# Patient Record
Sex: MALE | Race: WHITE | NOT HISPANIC OR LATINO | Employment: OTHER | URBAN - METROPOLITAN AREA
[De-identification: names, ages, dates, MRNs, and addresses within clinical notes are randomized per-mention and may not be internally consistent; named-entity substitution may affect disease eponyms.]

---

## 2019-04-09 ENCOUNTER — OFFICE VISIT (OUTPATIENT)
Dept: OPTOMETRY | Facility: CLINIC | Age: 54
End: 2019-04-09
Payer: COMMERCIAL

## 2019-04-09 DIAGNOSIS — H53.022 REFRACTIVE AMBLYOPIA OF LEFT EYE: ICD-10-CM

## 2019-04-09 DIAGNOSIS — H52.03 HYPEROPIA WITH PRESBYOPIA OF BOTH EYES: Primary | ICD-10-CM

## 2019-04-09 DIAGNOSIS — H31.002 CHORIORETINAL SCAR OF LEFT EYE: ICD-10-CM

## 2019-04-09 DIAGNOSIS — H52.222 REGULAR ASTIGMATISM OF LEFT EYE: ICD-10-CM

## 2019-04-09 DIAGNOSIS — H52.4 HYPEROPIA WITH PRESBYOPIA OF BOTH EYES: Primary | ICD-10-CM

## 2019-04-09 ASSESSMENT — EXTERNAL EXAM - LEFT EYE: OS_EXAM: NORMAL

## 2019-04-09 ASSESSMENT — VISUAL ACUITY
METHOD: SNELLEN - LINEAR
OD_CC: 1.25M
OD_CC: 20/30
OS_CC: 20/70-1
CORRECTION_TYPE: CONTACTS

## 2019-04-09 ASSESSMENT — REFRACTION_MANIFEST
OS_AXIS: 045
OS_CYLINDER: +2.25
OD_SPHERE: +3.25
OD_CYLINDER: SPHERE
OS_SPHERE: +3.25

## 2019-04-09 ASSESSMENT — EXTERNAL EXAM - RIGHT EYE: OD_EXAM: NORMAL

## 2019-04-09 ASSESSMENT — TONOMETRY
IOP_METHOD: ICARE
OD_IOP_MMHG: 21
OS_IOP_MMHG: 19

## 2019-04-09 ASSESSMENT — REFRACTION_CURRENTRX
OD_BRAND: AIR OPTIX MULTIFOCAL
OD_ADD: HIGH
OD_DIAMETER: 14.2
OS_BRAND: AIR OPTIX MULTIFOCAL
OS_DIAMETER: 14.2
OS_BASECURVE: 8.6
OD_SPHERE: +3.50
OS_SPHERE: +3.50
OS_ADD: HIGH
OD_BASECURVE: 8.6

## 2019-04-09 ASSESSMENT — CUP TO DISC RATIO
OD_RATIO: 0.35
OS_RATIO: 0.35

## 2019-04-09 ASSESSMENT — SLIT LAMP EXAM - LIDS
COMMENTS: NORMAL
COMMENTS: NORMAL

## 2019-04-09 ASSESSMENT — CONF VISUAL FIELD
OS_NORMAL: 1
OD_NORMAL: 1

## 2019-04-12 ENCOUNTER — TELEPHONE (OUTPATIENT)
Dept: OPTOMETRY | Facility: CLINIC | Age: 54
End: 2019-04-12

## 2019-04-12 NOTE — TELEPHONE ENCOUNTER
[facility]    Phone Message    May a detailed message be left on voicemail: yes    Reason for Call: Other: per edgard from [facility], would like pts glasses rx faxed over to [phone] with reference number [id number] on the cover sheet, thanks     Action Taken: Message routed to:  [facility] ([facility] eye

## 2019-10-03 ENCOUNTER — HEALTH MAINTENANCE LETTER (OUTPATIENT)
Age: 54
End: 2019-10-03

## 2019-12-27 ENCOUNTER — MYC MEDICAL ADVICE (OUTPATIENT)
Dept: FAMILY MEDICINE | Facility: CLINIC | Age: 54
End: 2019-12-27

## 2019-12-27 NOTE — TELEPHONE ENCOUNTER
Routing to Provider.     Would you like patient to make OV? Or refer to PT? Order queued. Ashtyn Peterson RN on 12/27/2019 at 2:14 PM

## 2020-11-07 ENCOUNTER — HEALTH MAINTENANCE LETTER (OUTPATIENT)
Age: 55
End: 2020-11-07

## 2021-08-12 ENCOUNTER — OFFICE VISIT (OUTPATIENT)
Dept: FAMILY MEDICINE | Facility: CLINIC | Age: 56
End: 2021-08-12
Payer: COMMERCIAL

## 2021-08-12 VITALS
SYSTOLIC BLOOD PRESSURE: 130 MMHG | WEIGHT: 195 LBS | BODY MASS INDEX: 25.84 KG/M2 | OXYGEN SATURATION: 98 % | TEMPERATURE: 97 F | DIASTOLIC BLOOD PRESSURE: 68 MMHG | HEART RATE: 78 BPM | HEIGHT: 73 IN

## 2021-08-12 DIAGNOSIS — Z12.5 SCREENING PSA (PROSTATE SPECIFIC ANTIGEN): ICD-10-CM

## 2021-08-12 DIAGNOSIS — Z00.00 ROUTINE HISTORY AND PHYSICAL EXAMINATION OF ADULT: Primary | ICD-10-CM

## 2021-08-12 DIAGNOSIS — R00.2 PALPITATIONS: ICD-10-CM

## 2021-08-12 DIAGNOSIS — G89.29 CHRONIC PAIN OF LEFT KNEE: ICD-10-CM

## 2021-08-12 DIAGNOSIS — M25.562 CHRONIC PAIN OF LEFT KNEE: ICD-10-CM

## 2021-08-12 DIAGNOSIS — Z13.6 CARDIOVASCULAR SCREENING; LDL GOAL LESS THAN 160: ICD-10-CM

## 2021-08-12 PROCEDURE — 36415 COLL VENOUS BLD VENIPUNCTURE: CPT | Performed by: FAMILY MEDICINE

## 2021-08-12 PROCEDURE — 80053 COMPREHEN METABOLIC PANEL: CPT | Performed by: FAMILY MEDICINE

## 2021-08-12 PROCEDURE — 93000 ELECTROCARDIOGRAM COMPLETE: CPT | Performed by: FAMILY MEDICINE

## 2021-08-12 PROCEDURE — G0103 PSA SCREENING: HCPCS | Performed by: FAMILY MEDICINE

## 2021-08-12 PROCEDURE — 80061 LIPID PANEL: CPT | Performed by: FAMILY MEDICINE

## 2021-08-12 PROCEDURE — 99213 OFFICE O/P EST LOW 20 MIN: CPT | Mod: 25 | Performed by: FAMILY MEDICINE

## 2021-08-12 PROCEDURE — 99386 PREV VISIT NEW AGE 40-64: CPT | Performed by: FAMILY MEDICINE

## 2021-08-12 ASSESSMENT — MIFFLIN-ST. JEOR: SCORE: 1767

## 2021-08-12 NOTE — PROGRESS NOTES
SUBJECTIVE:   CC: John Rivera is an 56 year old male who presents for preventative health visit.       Patient has been advised of split billing requirements and indicates understanding: Yes     Healthy Habits:     Getting at least 3 servings of Calcium per day:  NO    Bi-annual eye exam:  Yes    Dental care twice a year:  Yes    Sleep apnea or symptoms of sleep apnea:  None    Diet:  Regular (no restrictions)    Frequency of exercise:  None    Taking medications regularly:  Yes    Medication side effects:  Not applicable    PHQ-2 Total Score: 0    Additional concerns today:  Yes          Encounter Diagnoses   Name Primary?     Routine history and physical examination of adult Yes     Palpitations recently when working/ may have been dehydrated      CARDIOVASCULAR SCREENING; LDL GOAL LESS THAN 160      Chronic pain of left knee      Screening PSA (prostate specific antigen)         Today's PHQ-2 Score:   PHQ-2 ( 1999 Pf[employer]) 8/12/2021   Q1: Little interest or pleasure in doing things 0   Q2: Feeling down, depressed or hopeless 0   PHQ-2 Score 0   Q1: Little interest or pleasure in doing things Not at all   Q2: Feeling down, depressed or hopeless Not at all   PHQ-2 Score 0       Abuse: Current or Past(Physical, Sexual or Emotional)- No  Do you feel safe in your environment? Yes    Have you ever done Advance Care Planning? (For example, a Health Directive, POLST, or a discussion with a medical provider or your loved ones about your wishes):     Social History     Tobacco Use     Smoking status: Never Smoker     Smokeless tobacco: Never Used   Substance Use Topics     Alcohol use: Yes     Comment: 3/week     If you drink alcohol do you typically have >3 drinks per day or >7 drinks per week? No    Alcohol Use 8/12/2021   Prescreen: >3 drinks/day or >7 drinks/week? Yes   Prescreen: >3 drinks/day or >7 drinks/week? -   AUDIT SCORE  6     AUDIT - Alcohol Use Disorders Identification Test - Reproduced from the World  Health Organization Audit 2001 (Second Edition) 8/12/2021   1.  How often do you have a drink containing alcohol? 4 or more times a week   2.  How many drinks containing alcohol do you have on a typical day when you are drinking? 1 or 2   3.  How often do you have five or more drinks on one occasion? Monthly   4.  How often during the last year have you found that you were not able to stop drinking once you had started? Never   5.  How often during the last year have you failed to do what was normally expected of you because of drinking? Never   6.  How often during the last year have you needed a first drink in the morning to get yourself going after a heavy drinking session? Never   7.  How often during the last year have you had a feeling of guilt or remorse after drinking? Never   8.  How often during the last year have you been unable to remember what happened the night before because of your drinking? Never   9.  Have you or someone else been injured because of your drinking? No   10. Has a relative, friend, doctor or other health care worker been concerned about your drinking or suggested you cut down? No   TOTAL SCORE 6       Last PSA: No results found for: PSA    Reviewed orders with patient. Reviewed health maintenance and updated orders accordingly -   Lab work is in process    Reviewed and updated as needed this visit by clinical staff  Tobacco  Allergies  Meds              Reviewed and updated as needed this visit by Provider                Past Medical History:   Diagnosis Date     NO ACTIVE PROBLEMS         Review of Systems  CONSTITUTIONAL: NEGATIVE for fever, chills, change in weight  INTEGUMENTARY/SKIN: NEGATIVE for worrisome rashes, moles or lesions  EYES: NEGATIVE for vision changes or irritation  ENT: NEGATIVE for ear, mouth and throat problems  RESP: NEGATIVE for significant cough or SOB  CV: POSITIVE for palpitations and NEGATIVE for diaphoresis, dyspnea on exertion, irregular heart beat  "and lower extremity edema  GI: NEGATIVE for nausea, abdominal pain, heartburn, or change in bowel habits   male: negative for dysuria, hematuria, decreased urinary stream, erectile dysfunction, urethral discharge  MUSCULOSKELETAL: NEGATIVE for significant arthralgias or myalgia  PSYCHIATRIC: NEGATIVE for changes in mood or affect    OBJECTIVE:   /68   Pulse 78   Temp 97  F (36.1  C) (Temporal)   Ht 1.86 m (6' 1.23\")   Wt 88.5 kg (195 lb)   SpO2 98%   BMI 25.57 kg/m      Physical Exam  GENERAL: healthy, alert and no distress  EYES: Eyes grossly normal to inspection, PERRL and conjunctivae and sclerae normal  HENT: ear canals and TM's normal, nose and mouth without ulcers or lesions  NECK: no adenopathy, no asymmetry, masses, or scars and thyroid normal to palpation  RESP: lungs clear to auscultation - no rales, rhonchi or wheezes  CV: regular rate and rhythm, normal S1 S2, no S3 or S4, no murmur, click or rub, no peripheral edema and peripheral pulses strong  ABDOMEN: soft, nontender, no hepatosplenomegaly, no masses and bowel sounds normal   (male): normal male genitalia without lesions or urethral discharge, no hernia  RECTAL (male): normal sphincter tone, no rectal masses, prostate normal size, smooth, nontender without nodules or masses  MS: no gross musculoskeletal defects noted, no edema  SKIN: no suspicious lesions or rashes  NEURO: Normal strength and tone, sensory exam grossly normal and mentation intact  PSYCH: mentation appears normal, affect normal/bright    Diagnostic Test Results:  Labs reviewed in Epic  EKG NSR  ASSESSMENT/PLAN:   1. Routine history and physical examination of adult  In good health, needs to get knee better so he can exercise    2. Palpitations  normal exam  - EKG 12-lead complete w/read - Clinics  - Comprehensive metabolic panel (BMP + Alb, Alk Phos, ALT, AST, Total. Bili, TP); Future  - Comprehensive metabolic panel (BMP + Alb, Alk Phos, ALT, AST, Total. Bili, " "TP)    3. CARDIOVASCULAR SCREENING; LDL GOAL LESS THAN 160  sent  - Lipid panel reflex to direct LDL Fasting; Future  - Lipid panel reflex to direct LDL Fasting    4. Chronic pain of left knee  Follow up with consultant as planned.     5. Screening PSA (prostate specific antigen)  sent  - PSA, screen; Future  - PSA, screen   COUNSELING:   Reviewed preventive health counseling, as reflected in patient instructions       Regular exercise       Healthy diet/nutrition       Vision screening       Safe sex practices/STD prevention       Colon cancer screening       Prostate cancer screening    Estimated body mass index is 25.57 kg/m  as calculated from the following:    Height as of this encounter: 1.86 m (6' 1.23\").    Weight as of this encounter: 88.5 kg (195 lb).         He reports that he has never smoked. He has never used smokeless tobacco.      Counseling Resources:  ATP IV Guidelines  Pooled Cohorts Equation Calculator  FRAX Risk Assessment  ICSI Preventive Guidelines  Dietary Guidelines for Americans, 2010  USDA's MyPlate  ASA Prophylaxis  Lung CA Screening    Nayan Galaviz MD  [employer]  "

## 2021-08-13 LAB
ALBUMIN SERPL-MCNC: 3.7 G/DL (ref 3.4–5)
ALP SERPL-CCNC: 136 U/L (ref 40–150)
ALT SERPL W P-5'-P-CCNC: 23 U/L (ref 0–70)
ANION GAP SERPL CALCULATED.3IONS-SCNC: 2 MMOL/L (ref 3–14)
AST SERPL W P-5'-P-CCNC: 21 U/L (ref 0–45)
BILIRUB SERPL-MCNC: 0.3 MG/DL (ref 0.2–1.3)
BUN SERPL-MCNC: 16 MG/DL (ref 7–30)
CALCIUM SERPL-MCNC: 9.2 MG/DL (ref 8.5–10.1)
CHLORIDE BLD-SCNC: 108 MMOL/L (ref 94–109)
CHOLEST SERPL-MCNC: 240 MG/DL
CO2 SERPL-SCNC: 30 MMOL/L (ref 20–32)
CREAT SERPL-MCNC: 1.02 MG/DL (ref 0.66–1.25)
FASTING STATUS PATIENT QL REPORTED: NO
GFR SERPL CREATININE-BSD FRML MDRD: 81 ML/MIN/1.73M2
GLUCOSE BLD-MCNC: 80 MG/DL (ref 70–99)
HDLC SERPL-MCNC: 84 MG/DL
LDLC SERPL CALC-MCNC: 138 MG/DL
NONHDLC SERPL-MCNC: 156 MG/DL
POTASSIUM BLD-SCNC: 4.2 MMOL/L (ref 3.4–5.3)
PROT SERPL-MCNC: 7 G/DL (ref 6.8–8.8)
PSA SERPL-MCNC: 0.45 UG/L (ref 0–4)
SODIUM SERPL-SCNC: 140 MMOL/L (ref 133–144)
TRIGL SERPL-MCNC: 89 MG/DL

## 2021-09-05 ENCOUNTER — HEALTH MAINTENANCE LETTER (OUTPATIENT)
Age: 56
End: 2021-09-05

## 2021-09-20 NOTE — PROGRESS NOTES
[facility]  [address]  [facility] [address]  Phone: [phone]  Primary Provider: Nayan Galaviz  Pre-op Performing Provider: LUDWIN ABRAHAM      PREOPERATIVE EVALUATION:  Today's date: 9/21/2021    John Rivera is a 56 year old male who presents for a preoperative evaluation.    Surgical Information:  Surgery/Procedure: left knee meniscus repair  Surgery Location: [facility]  Surgeon: Dr. Rose  Surgery Date: 9/22/21  Time of Surgery: 1:15 PM  Where patient plans to recover: At home with family  Fax number for surgical facility: [phone]    Type of Anesthesia Anticipated: General    Assessment & Plan     The proposed surgical procedure is considered INTERMEDIATE risk.    Preop general physical exam  - Comprehensive metabolic panel; Future  - Hemoglobin; Future  - INFLUENZA QUAD, PF (RIV4) (FLUBLOK)    Chronic pain of left knee  preop done today; follow up with orthopedics  - Comprehensive metabolic panel; Future  - Hemoglobin; Future  - INFLUENZA QUAD, PF (RIV4) (FLUBLOK)    Need for immunization against influenza  - INFLUENZA QUAD, PF (RIV4) (FLUBLOK)      Risks and Recommendations:  The patient has the following additional risks and recommendations for perioperative complications:   - No identified additional risk factors other than previously addressed    Do not take any aspirin or any aspirin-containing products at least 10 days prior to your surgical procedure. This includes any anti-inflammatory medications or NSAIDS (ibuprofen, advil, aleve type products). If you have any questions regarding this or any medication you are currently taking on a regular basis, please call and speak to your surgeon or surgeon's nurse.      Medication Instructions:  Patient is on no chronic medications    RECOMMENDATION:  APPROVAL GIVEN to proceed with proposed procedure, without further diagnostic evaluation.    Review of prior external note(s) from -  previous PCP notes      10 minutes spent on the date of the encounter doing chart review, history and exam, documentation and further activities per the note        Subjective     HPI related to upcoming procedure: history of left meniscal tear with plans for surgical repair    Preop Questions 9/14/2021   1. Have you ever had a heart attack or stroke? No   2. Have you ever had surgery on your heart or blood vessels, such as a stent placement, a coronary artery bypass, or surgery on an artery in your head, neck, heart, or legs? No   3. Do you have chest pain with activity? No   4. Do you have a history of  heart failure? No   5. Do you currently have a cold, bronchitis or symptoms of other infection? No   6. Do you have a cough, shortness of breath, or wheezing? No   7. Do you or anyone in your family have previous history of blood clots? No   8. Do you or does anyone in your family have a serious bleeding problem such as prolonged bleeding following surgeries or cuts? No   9. Have you ever had problems with anemia or been told to take iron pills? No   10. Have you had any abnormal blood loss such as black, tarry or bloody stools? No   11. Have you ever had a blood transfusion? No   12. Are you willing to have a blood transfusion if it is medically needed before, during, or after your surgery? Yes   13. Have you or any of your relatives ever had problems with anesthesia? No   14. Do you have sleep apnea, excessive snoring or daytime drowsiness? No   15. Do you have any artifical heart valves or other implanted medical devices like a pacemaker, defibrillator, or continuous glucose monitor? No   16. Do you have artificial joints? No   17. Are you allergic to latex? No       Health Care Directive:  Patient does not have a Health Care Directive or Living Will: Discussed advance care planning with patient; however, patient declined at this time.    Preoperative Review of :   reviewed - no record of controlled  "substances prescribed.      Status of Chronic Conditions:  See problem list for active medical problems.  Problems all longstanding and stable, except as noted/documented.  See ROS for pertinent symptoms related to these conditions.      Review of Systems  Constitutional, neuro, ENT, endocrine, pulmonary, cardiac, gastrointestinal, genitourinary, musculoskeletal, integument and psychiatric systems are negative, except as otherwise noted.    Patient Active Problem List    Diagnosis Date Noted     Degeneration of meniscus of left knee 06/19/2015     Priority: Medium     Skin exam, screening for cancer 02/19/2014     Priority: Medium     CARDIOVASCULAR SCREENING; LDL GOAL LESS THAN 160 02/05/2013     Priority: Medium      Past Medical History:   Diagnosis Date     NO ACTIVE PROBLEMS      Past Surgical History:   Procedure Laterality Date     COLONOSCOPY       LAMINECT/DISCECTOMY, LUMBAR  1992     No current outpatient medications on file.       No Known Allergies     Social History     Tobacco Use     Smoking status: Never Smoker     Smokeless tobacco: Never Used   Substance Use Topics     Alcohol use: Yes     Comment: 3/week     Family History   Problem Relation Age of Onset     Heart Disease Mother      Hypertension Mother      Other Cancer Mother      Cancer Father      Hypertension Father      Cerebrovascular Disease Maternal Grandmother      Cerebrovascular Disease Maternal Grandfather      Heart Disease Paternal Grandfather      Skin Cancer No family hx of      Glaucoma No family hx of      Macular Degeneration No family hx of      History   Drug Use No         Objective     /76 (Patient Position: Sitting, Cuff Size: Adult Large)   Pulse 67   Temp 97.6  F (36.4  C) (Tympanic)   Resp 22   Ht 1.859 m (6' 1.2\")   Wt 89.6 kg (197 lb 9.6 oz)   SpO2 98%   BMI 25.93 kg/m      Physical Exam    GENERAL APPEARANCE: healthy, alert and no distress     EYES: EOMI,  PERRL     HENT: ear canals and TM's normal and " nose and mouth without ulcers or lesions     NECK: no adenopathy, no asymmetry, masses, or scars and thyroid normal to palpation     RESP: lungs clear to auscultation - no rales, rhonchi or wheezes     CV: regular rates and rhythm, normal S1 S2, no S3 or S4 and no murmur, click or rub     ABDOMEN:  soft, nontender, no HSM or masses and bowel sounds normal     MS: extremities normal- no gross deformities noted, no evidence of inflammation in joints, FROM in all extremities.     SKIN: no suspicious lesions or rashes     NEURO: Normal strength and tone, sensory exam grossly normal, mentation intact and speech normal     PSYCH: mentation appears normal. and affect normal/bright     LYMPHATICS: No cervical adenopathy    Recent Labs   Lab Test 08/12/21  1625      POTASSIUM 4.2   CR 1.02        Diagnostics:  Labs pending at this time.  Results will be reviewed when available.   No EKG required, no history of coronary heart disease, significant arrhythmia, peripheral arterial disease or other structural heart disease.    Revised Cardiac Risk Index (RCRI):  The patient has the following serious cardiovascular risks for perioperative complications:   - No serious cardiac risks = 0 points     RCRI Interpretation: 0 points: Class I (very low risk - 0.4% complication rate)           Signed Electronically by: Amanda Young PA-C  Copy of this evaluation report is provided to requesting physician.

## 2021-09-20 NOTE — PATIENT INSTRUCTIONS

## 2021-09-21 ENCOUNTER — OFFICE VISIT (OUTPATIENT)
Dept: FAMILY MEDICINE | Facility: CLINIC | Age: 56
End: 2021-09-21
Payer: COMMERCIAL

## 2021-09-21 VITALS
DIASTOLIC BLOOD PRESSURE: 76 MMHG | OXYGEN SATURATION: 98 % | BODY MASS INDEX: 26.19 KG/M2 | SYSTOLIC BLOOD PRESSURE: 127 MMHG | HEART RATE: 67 BPM | WEIGHT: 197.6 LBS | RESPIRATION RATE: 22 BRPM | HEIGHT: 73 IN | TEMPERATURE: 97.6 F

## 2021-09-21 DIAGNOSIS — Z01.818 PREOP GENERAL PHYSICAL EXAM: Primary | ICD-10-CM

## 2021-09-21 DIAGNOSIS — Z23 NEED FOR IMMUNIZATION AGAINST INFLUENZA: ICD-10-CM

## 2021-09-21 DIAGNOSIS — M25.562 CHRONIC PAIN OF LEFT KNEE: ICD-10-CM

## 2021-09-21 DIAGNOSIS — G89.29 CHRONIC PAIN OF LEFT KNEE: ICD-10-CM

## 2021-09-21 LAB — HGB BLD-MCNC: 14.9 G/DL (ref 13.3–17.7)

## 2021-09-21 PROCEDURE — 90471 IMMUNIZATION ADMIN: CPT | Performed by: PHYSICIAN ASSISTANT

## 2021-09-21 PROCEDURE — 85018 HEMOGLOBIN: CPT | Performed by: PHYSICIAN ASSISTANT

## 2021-09-21 PROCEDURE — 99214 OFFICE O/P EST MOD 30 MIN: CPT | Mod: 25 | Performed by: PHYSICIAN ASSISTANT

## 2021-09-21 PROCEDURE — 80053 COMPREHEN METABOLIC PANEL: CPT | Performed by: PHYSICIAN ASSISTANT

## 2021-09-21 PROCEDURE — 90682 RIV4 VACC RECOMBINANT DNA IM: CPT | Performed by: PHYSICIAN ASSISTANT

## 2021-09-21 PROCEDURE — 36415 COLL VENOUS BLD VENIPUNCTURE: CPT | Performed by: PHYSICIAN ASSISTANT

## 2021-09-21 ASSESSMENT — MIFFLIN-ST. JEOR: SCORE: 1778.36

## 2021-09-21 NOTE — NURSING NOTE
Colonoscopy done on this date: 12/12/2014 (approximately), by this group: [facility] [place], results were normal, recommended repeat colonoscopy in 10 years. - sent to abstract.    Marline Burr MA on 9/21/2021 at 1:10 PM

## 2021-09-21 NOTE — LETTER
[facility]  [address]  [facility] [address]  Phone: [phone]  Primary Provider: Nayan Galaviz  Pre-op Performing Provider: LUDWIN ABRAHAM      PREOPERATIVE EVALUATION:  Today's date: 9/21/2021    John Rivera is a 56 year old male who presents for a preoperative evaluation.    Surgical Information:  Surgery/Procedure: left knee meniscus repair  Surgery [facility]  Surgeon: Dr. Rose  Surgery Date: 9/22/21  Time of Surgery: 1:15 PM  Where patient plans to recover: At home with family  Fax number for surgical facility: [phone]    Type of Anesthesia Anticipated: General    Assessment & Plan     The proposed surgical procedure is considered INTERMEDIATE risk.    Preop general physical exam  - Comprehensive metabolic panel; Future  - Hemoglobin; Future  - INFLUENZA QUAD, PF (RIV4) (FLUBLOK)    Chronic pain of left knee  preop done today; follow up with orthopedics  - Comprehensive metabolic panel; Future  - Hemoglobin; Future  - INFLUENZA QUAD, PF (RIV4) (FLUBLOK)    Need for immunization against influenza  - INFLUENZA QUAD, PF (RIV4) (FLUBLOK)      Risks and Recommendations:  The patient has the following additional risks and recommendations for perioperative complications:   - No identified additional risk factors other than previously addressed    Do not take any aspirin or any aspirin-containing products at least 10 days prior to your surgical procedure. This includes any anti-inflammatory medications or NSAIDS (ibuprofen, advil, aleve type products). If you have any questions regarding this or any medication you are currently taking on a regular basis, please call and speak to your surgeon or surgeon's nurse.      Medication Instructions:  Patient is on no chronic medications    RECOMMENDATION:  APPROVAL GIVEN to proceed with proposed procedure, without further diagnostic evaluation.    Review of prior external note(s) from -  previous PCP notes      10 minutes spent on the date of the encounter doing chart review, history and exam, documentation and further activities per the note        Subjective     HPI related to upcoming procedure: history of left meniscal tear with plans for surgical repair    Preop Questions 9/14/2021   1. Have you ever had a heart attack or stroke? No   2. Have you ever had surgery on your heart or blood vessels, such as a stent placement, a coronary artery bypass, or surgery on an artery in your head, neck, heart, or legs? No   3. Do you have chest pain with activity? No   4. Do you have a history of  heart failure? No   5. Do you currently have a cold, bronchitis or symptoms of other infection? No   6. Do you have a cough, shortness of breath, or wheezing? No   7. Do you or anyone in your family have previous history of blood clots? No   8. Do you or does anyone in your family have a serious bleeding problem such as prolonged bleeding following surgeries or cuts? No   9. Have you ever had problems with anemia or been told to take iron pills? No   10. Have you had any abnormal blood loss such as black, tarry or bloody stools? No   11. Have you ever had a blood transfusion? No   12. Are you willing to have a blood transfusion if it is medically needed before, during, or after your surgery? Yes   13. Have you or any of your relatives ever had problems with anesthesia? No   14. Do you have sleep apnea, excessive snoring or daytime drowsiness? No   15. Do you have any artifical heart valves or other implanted medical devices like a pacemaker, defibrillator, or continuous glucose monitor? No   16. Do you have artificial joints? No   17. Are you allergic to latex? No       Health Care Directive:  Patient does not have a Health Care Directive or Living Will: Discussed advance care planning with patient; however, patient declined at this time.    Preoperative Review of :   reviewed - no record of controlled  "substances prescribed.      Status of Chronic Conditions:  See problem list for active medical problems.  Problems all longstanding and stable, except as noted/documented.  See ROS for pertinent symptoms related to these conditions.      Review of Systems  Constitutional, neuro, ENT, endocrine, pulmonary, cardiac, gastrointestinal, genitourinary, musculoskeletal, integument and psychiatric systems are negative, except as otherwise noted.    Patient Active Problem List    Diagnosis Date Noted     Degeneration of meniscus of left knee 06/19/2015     Priority: Medium     Skin exam, screening for cancer 02/19/2014     Priority: Medium     CARDIOVASCULAR SCREENING; LDL GOAL LESS THAN 160 02/05/2013     Priority: Medium      Past Medical History:   Diagnosis Date     NO ACTIVE PROBLEMS      Past Surgical History:   Procedure Laterality Date     COLONOSCOPY       LAMINECT/DISCECTOMY, LUMBAR  1992     No current outpatient medications on file.       No Known Allergies     Social History     Tobacco Use     Smoking status: Never Smoker     Smokeless tobacco: Never Used   Substance Use Topics     Alcohol use: Yes     Comment: 3/week     Family History   Problem Relation Age of Onset     Heart Disease Mother      Hypertension Mother      Other Cancer Mother      Cancer Father      Hypertension Father      Cerebrovascular Disease Maternal Grandmother      Cerebrovascular Disease Maternal Grandfather      Heart Disease Paternal Grandfather      Skin Cancer No family hx of      Glaucoma No family hx of      Macular Degeneration No family hx of      History   Drug Use No         Objective     /76 (Patient Position: Sitting, Cuff Size: Adult Large)   Pulse 67   Temp 97.6  F (36.4  C) (Tympanic)   Resp 22   Ht 1.859 m (6' 1.2\")   Wt 89.6 kg (197 lb 9.6 oz)   SpO2 98%   BMI 25.93 kg/m      Physical Exam    GENERAL APPEARANCE: healthy, alert and no distress     EYES: EOMI,  PERRL     HENT: ear canals and TM's normal and " nose and mouth without ulcers or lesions     NECK: no adenopathy, no asymmetry, masses, or scars and thyroid normal to palpation     RESP: lungs clear to auscultation - no rales, rhonchi or wheezes     CV: regular rates and rhythm, normal S1 S2, no S3 or S4 and no murmur, click or rub     ABDOMEN:  soft, nontender, no HSM or masses and bowel sounds normal     MS: extremities normal- no gross deformities noted, no evidence of inflammation in joints, FROM in all extremities.     SKIN: no suspicious lesions or rashes     NEURO: Normal strength and tone, sensory exam grossly normal, mentation intact and speech normal     PSYCH: mentation appears normal. and affect normal/bright     LYMPHATICS: No cervical adenopathy    Recent Labs   Lab Test 08/12/21  1625      POTASSIUM 4.2   CR 1.02        Diagnostics:  Labs pending at this time.  Results will be reviewed when available.   No EKG required, no history of coronary heart disease, significant arrhythmia, peripheral arterial disease or other structural heart disease.    Revised Cardiac Risk Index (RCRI):  The patient has the following serious cardiovascular risks for perioperative complications:   - No serious cardiac risks = 0 points     RCRI Interpretation: 0 points: Class I (very low risk - 0.4% complication rate)           Signed Electronically by: Amanda Young PA-C  Copy of this evaluation report is provided to requesting physician.

## 2021-09-21 NOTE — NURSING NOTE
Prior to immunization administration, verified patients identity using patient s name and date of birth. Please see Immunization Activity for additional information.     Screening Questionnaire for Adult Immunization    Are you sick today?   No   Do you have allergies to medications, food, a vaccine component or latex?   No   Have you ever had a serious reaction after receiving a vaccination?   No   Do you have a long-term health problem with heart, lung, kidney, or metabolic disease (e.g., diabetes), asthma, a blood disorder, no spleen, complement component deficiency, a cochlear implant, or a spinal fluid leak?  Are you on long-term aspirin therapy?   No   Do you have cancer, leukemia, HIV/AIDS, or any other immune system problem?   No   Do you have a parent, brother, or sister with an immune system problem?   No   In the past 3 months, have you taken medications that affect  your immune system, such as prednisone, other steroids, or anticancer drugs; drugs for the treatment of rheumatoid arthritis, Crohn s disease, or psoriasis; or have you had radiation treatments?   No   Have you had a seizure, or a brain or other nervous system problem?   No   During the past year, have you received a transfusion of blood or blood    products, or been given immune (gamma) globulin or antiviral drug?   No   For women: Are you pregnant or is there a chance you could become       pregnant during the next month?   No   Have you received any vaccinations in the past 4 weeks?   No     Immunization questionnaire answers were all negative.        Per orders of Jonathon Young PA-C, injection of Flublok given by Marline Burr MA. Patient instructed to remain in clinic for 15 minutes afterwards, and to report any adverse reaction to me immediately.       Screening performed by Marline Burr MA on 9/21/2021 at 12:44 PM.  Marline Burr MA on 9/21/2021 at 12:45 PM

## 2021-09-21 NOTE — Clinical Note
Please abstract the following data from this visit with this patient into the appropriate field in Epic:    Tests that can be patient reported without a hard copy:    Colonoscopy done on this date: 12/12/2014 (approximately), by this group: [facility], results were normal, recommended repeat colonoscopy in 10 years.     Other Tests found in the patient's chart through Chart Review/Care Everywhere:    {Abstract Quality List (Optional):978044}    Note to Abstraction: If this section is blank, no results were found via Chart Review/Care Everywhere.

## 2021-09-22 LAB
ALBUMIN SERPL-MCNC: 4 G/DL (ref 3.4–5)
ALP SERPL-CCNC: 133 U/L (ref 40–150)
ALT SERPL W P-5'-P-CCNC: 26 U/L (ref 0–70)
ANION GAP SERPL CALCULATED.3IONS-SCNC: 5 MMOL/L (ref 3–14)
AST SERPL W P-5'-P-CCNC: 20 U/L (ref 0–45)
BILIRUB SERPL-MCNC: 0.5 MG/DL (ref 0.2–1.3)
BUN SERPL-MCNC: 17 MG/DL (ref 7–30)
CALCIUM SERPL-MCNC: 9.2 MG/DL (ref 8.5–10.1)
CHLORIDE BLD-SCNC: 106 MMOL/L (ref 94–109)
CO2 SERPL-SCNC: 28 MMOL/L (ref 20–32)
CREAT SERPL-MCNC: 0.93 MG/DL (ref 0.66–1.25)
GFR SERPL CREATININE-BSD FRML MDRD: >90 ML/MIN/1.73M2
GLUCOSE BLD-MCNC: 70 MG/DL (ref 70–99)
POTASSIUM BLD-SCNC: 4.6 MMOL/L (ref 3.4–5.3)
PROT SERPL-MCNC: 7.8 G/DL (ref 6.8–8.8)
SODIUM SERPL-SCNC: 139 MMOL/L (ref 133–144)

## 2021-09-23 NOTE — RESULT ENCOUNTER NOTE
"Nicole Lopez  Your attached labs are normal. Best of luck with your procedure!  Please contact the office with any questions or concerns.    Amanda Marquez \"Jonathon\" LUCIO Young    "

## 2021-10-13 ENCOUNTER — OFFICE VISIT (OUTPATIENT)
Dept: DERMATOLOGY | Facility: CLINIC | Age: 56
End: 2021-10-13
Payer: COMMERCIAL

## 2021-10-13 DIAGNOSIS — L41.9 PARAPSORIASIS: Primary | ICD-10-CM

## 2021-10-13 DIAGNOSIS — L21.9 SEBORRHEIC DERMATITIS: ICD-10-CM

## 2021-10-13 PROCEDURE — 99203 OFFICE O/P NEW LOW 30 MIN: CPT | Performed by: DERMATOLOGY

## 2021-10-13 RX ORDER — HYDROXYZINE PAMOATE 25 MG/1
CAPSULE ORAL
COMMUNITY
Start: 2021-09-22 | End: 2023-08-04

## 2021-10-13 ASSESSMENT — PAIN SCALES - GENERAL: PAINLEVEL: NO PAIN (0)

## 2021-10-13 NOTE — NURSING NOTE
Chief Complaint   Patient presents with     Derm Problem     John, is here for his dermatits, no toher concerns    Jm Pena EMT

## 2021-10-13 NOTE — PROGRESS NOTES
[facility] Dermatology Note  Encounter Date: Oct 13, 2021  Office Visit     Dermatology Problem List:  1. 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 Seborrheic dermatitis  - Regular showers and cortisone cream prn    ____________________________________________    Assessment & Plan:    # Parapsoriasis.   - Monitor: Patient to continue monitoring at home and will contact the clinic for any changes.   - Continue applying OTC cortisone cream to affected area     # Seborrheic dermatitis.   - Recommended he continue with regular showers and encouraged him to apply cortisone cream to affected area if symptoms worsen     Procedures Performed:   None    Follow-up: Every 1-2 years for follow up, sooner if he has new or worsening symptoms.    Staff and Medical Student:     I saw and examined this patient in the presence of Dr. Rodriguez.     Sridhar Louise, MS4  [facility]     I was present with the medical student who participated in the service and in the documentation of the note. I have verified the history and personally performed the physical exam and medical decision making. I agree with the assessment and plan of care as documented in the note.  Patricia Rordiguez MD    ____________________________________________    CC: Derm Problem (John, is here for his dermatits, no toher concerns )    HPI:  Mr. John Rivera is a(n) 56 year old male who presents today as a new patient for parapsoriasis and seborrheic dermatitis. I last saw this patient in 2015, and at that time, his conditions were stable. He reports to the clinic today to re-establish care.    Regarding his parapsoriasis, the patient reports that his symptoms have been mostly stable. He does think that extremes of temperature and stress trigger the rash. The rash is located on the left buttock and extends down the back of his  leg. During flares, he notes that it stings, burns, and itches. When he does have a flare, he applies an OTC cortisone cream, and this seems to resolve his symptoms. Denies fever, chills, fatigue, unexplained weight loss, night sweats, nausea, and vomiting.    Regarding his seborrheic dermatitis, he shares that he does occasionally have irritation of the scalp and sideburn areas if he does not shower regularly. However, he has been able to control the symptoms very well by showering regularly.    Patient is otherwise feeling well, without additional skin concerns.    Labs:  None reviewed.    Physical Exam:  Vitals: There were no vitals taken for this visit.  SKIN: Focused examination of the scalp, left buttock, and left thigh was performed.  - On the left lateral buttock, extending to the posterosuperior aspect of the left thigh is an ill-defined, slightly hyperpigmented plaque with mild scaling in a cigarette paper like texture  - Minimal scaling on scalp and sideburn areas.  - No other lesions of concern on areas examined.     Medications:  Current Outpatient Medications   Medication     hydrOXYzine (VISTARIL) 25 MG capsule     No current facility-administered medications for this visit.      Past Medical History:   Patient Active Problem List   Diagnosis     CARDIOVASCULAR SCREENING; LDL GOAL LESS THAN 160     Skin exam, screening for cancer     Degeneration of meniscus of left knee     Past Medical History:   Diagnosis Date     NO ACTIVE PROBLEMS         CC Referred Self, MD  No address on file on close of this encounter.

## 2021-10-13 NOTE — LETTER
10/13/2021       RE: John Rivera  [address]  [place] [address]     Dear Colleague,    Thank you for referring your patient, John Rivera, to the [facility] [place] at [facility]. Please see a copy of my visit note below.    [facility] Dermatology Note  Encounter Date: Oct 13, 2021  Office Visit     Dermatology Problem List:  1. 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 Seborrheic dermatitis  - Regular showers and cortisone cream prn    ____________________________________________    Assessment & Plan:    # Parapsoriasis.   - Monitor: Patient to continue monitoring at home and will contact the clinic for any changes.   - Continue applying OTC cortisone cream to affected area     # Seborrheic dermatitis.   - Recommended he continue with regular showers and encouraged him to apply cortisone cream to affected area if symptoms worsen     Procedures Performed:   None    Follow-up: Every 1-2 years for follow up, sooner if he has new or worsening symptoms.    Staff and Medical Student:     I saw and examined this patient in the presence of Dr. Rodriguez.     Sridhar Louise, MS4  [facility]     I was present with the medical student who participated in the service and in the documentation of the note. I have verified the history and personally performed the physical exam and medical decision making. I agree with the assessment and plan of care as documented in the note.  Patricia Rodriguez MD    ____________________________________________    CC: Derm Problem (John, is here for his dermatits, no toher concerns )    HPI:  Mr. John Rivera is a(n) 56 year old male who presents today as a new patient for parapsoriasis and seborrheic dermatitis. I last saw this patient in 2015, and at that time, his  conditions were stable. He reports to the clinic today to re-establish care.    Regarding his parapsoriasis, the patient reports that his symptoms have been mostly stable. He does think that extremes of temperature and stress trigger the rash. The rash is located on the left buttock and extends down the back of his leg. During flares, he notes that it stings, burns, and itches. When he does have a flare, he applies an OTC cortisone cream, and this seems to resolve his symptoms. Denies fever, chills, fatigue, unexplained weight loss, night sweats, nausea, and vomiting.    Regarding his seborrheic dermatitis, he shares that he does occasionally have irritation of the scalp and sideburn areas if he does not shower regularly. However, he has been able to control the symptoms very well by showering regularly.    Patient is otherwise feeling well, without additional skin concerns.    Labs:  None reviewed.    Physical Exam:  Vitals: There were no vitals taken for this visit.  SKIN: Focused examination of the scalp, left buttock, and left thigh was performed.  - On the left lateral buttock, extending to the posterosuperior aspect of the left thigh is an ill-defined, slightly hyperpigmented plaque with mild scaling in a cigarette paper like texture  - Minimal scaling on scalp and sideburn areas.  - No other lesions of concern on areas examined.     Medications:  Current Outpatient Medications   Medication     hydrOXYzine (VISTARIL) 25 MG capsule     No current facility-administered medications for this visit.      Past Medical History:   Patient Active Problem List   Diagnosis     CARDIOVASCULAR SCREENING; LDL GOAL LESS THAN 160     Skin exam, screening for cancer     Degeneration of meniscus of left knee     Past Medical History:   Diagnosis Date     NO ACTIVE PROBLEMS         CC Referred Self, MD  No address on file on close of this encounter.

## 2022-10-29 ENCOUNTER — HEALTH MAINTENANCE LETTER (OUTPATIENT)
Age: 57
End: 2022-10-29

## 2023-06-09 ENCOUNTER — ANCILLARY PROCEDURE (OUTPATIENT)
Dept: GENERAL RADIOLOGY | Facility: CLINIC | Age: 58
End: 2023-06-09
Attending: NURSE PRACTITIONER
Payer: COMMERCIAL

## 2023-06-09 ENCOUNTER — OFFICE VISIT (OUTPATIENT)
Dept: URGENT CARE | Facility: URGENT CARE | Age: 58
End: 2023-06-09
Payer: COMMERCIAL

## 2023-06-09 VITALS
DIASTOLIC BLOOD PRESSURE: 84 MMHG | HEART RATE: 61 BPM | OXYGEN SATURATION: 99 % | SYSTOLIC BLOOD PRESSURE: 139 MMHG | RESPIRATION RATE: 17 BRPM | TEMPERATURE: 98.1 F

## 2023-06-09 DIAGNOSIS — M79.645 PAIN OF FINGER OF LEFT HAND: Primary | ICD-10-CM

## 2023-06-09 DIAGNOSIS — M79.645 PAIN OF FINGER OF LEFT HAND: ICD-10-CM

## 2023-06-09 PROCEDURE — 99214 OFFICE O/P EST MOD 30 MIN: CPT | Performed by: NURSE PRACTITIONER

## 2023-06-09 PROCEDURE — 73140 X-RAY EXAM OF FINGER(S): CPT | Mod: TC | Performed by: RADIOLOGY

## 2023-06-09 NOTE — PATIENT INSTRUCTIONS
Likely trigger finger with degenerative change on middle phalanx  Rest ice compression with finger splint elevate  Take Tylenol ibuprofen as needed  Follow-up with Ortho hand specialist if this lingers or worsens

## 2023-06-09 NOTE — PROGRESS NOTES
Chief Complaint   Patient presents with     Finger     Pt noticed yesterday lt little finger was tender,swollen and the tip will not straighten up just kindve flops down      SUBJECTIVE:  John Rivera is a 58 year old male who presents to the clinic today with bent left DIP pinky finger with middle phalanx pain since yesterday, no known injury.  He does relay some overuse with working lifting and was also out fishing yesterday.  Declines tingling pallor cool sensation pulselessness.  There is slight numbness.  He is able to fully passively extend the finger, but not actively.  He does have his flexion intact.    Past Medical History:   Diagnosis Date     NO ACTIVE PROBLEMS      hydrOXYzine (VISTARIL) 25 MG capsule, TAKE 1-2 CAPSULES BY MOUTH EVERY 4-6 HOURS AS NEEDED FOR PAIN/MUSCLE SPASMS (Patient not taking: Reported on 6/9/2023)    No current facility-administered medications on file prior to visit.    Social History     Tobacco Use     Smoking status: Never     Smokeless tobacco: Never   Vaping Use     Vaping status: Never Used   Substance Use Topics     Alcohol use: Yes     Comment: 3/week     No Known Allergies    Review of Systems  All systems negative except for those listed above in HPI.    EXAM:   /84 (BP Location: Right arm, Patient Position: Sitting, Cuff Size: Adult Large)   Pulse 61   Temp 98.1  F (36.7  C) (Temporal)   Resp 17   SpO2 99%     Physical Exam  Vitals reviewed.   Constitutional:       Appearance: Normal appearance.   HENT:      Head: Normocephalic and atraumatic.      Nose: Nose normal.      Mouth/Throat:      Mouth: Mucous membranes are moist.      Pharynx: Oropharynx is clear.   Eyes:      Extraocular Movements: Extraocular movements intact.      Conjunctiva/sclera: Conjunctivae normal.      Pupils: Pupils are equal, round, and reactive to light.   Cardiovascular:      Rate and Rhythm: Normal rate.      Pulses: Normal pulses.   Pulmonary:      Effort: Pulmonary effort is  normal.   Musculoskeletal:         General: Tenderness present. No swelling, deformity or signs of injury. Normal range of motion.      Cervical back: Normal range of motion and neck supple.      Comments: Left DIP with slight flexion.  Tenderness to distal middle phalanx and DIP.   Skin:     General: Skin is warm and dry.      Findings: No bruising, erythema or rash.   Neurological:      General: No focal deficit present.      Mental Status: He is alert and oriented to person, place, and time.      Sensory: Sensory deficit present.   Psychiatric:         Mood and Affect: Mood normal.         Behavior: Behavior normal.       X-ray done in clinic read by me as degenerative changes to the distal middle phalanx.  Joint spaces and alignment are maintained.    ASSESSMENT:    ICD-10-CM    1. Pain of finger of left hand  M79.645 XR Finger Left G/E 2 Views        PLAN:    Likely trigger finger with degenerative change on middle phalanx  Rest ice compression with finger splint elevate  Take Tylenol ibuprofen as needed  Follow-up with Ortho hand specialist if this lingers or worsens    Follow up with primary care provider with any problems, questions or concerns or if symptoms worsen or fail to improve. Patient agreed to plan and verbalized understanding.    Luh Haque, BOBP-BC  [facility]

## 2023-06-28 ENCOUNTER — TRANSFERRED RECORDS (OUTPATIENT)
Dept: HEALTH INFORMATION MANAGEMENT | Facility: CLINIC | Age: 58
End: 2023-06-28

## 2023-08-02 ASSESSMENT — ENCOUNTER SYMPTOMS
MYALGIAS: 0
HEADACHES: 0
WEAKNESS: 0
DYSURIA: 0
NERVOUS/ANXIOUS: 0
ABDOMINAL PAIN: 0
PALPITATIONS: 0
HEARTBURN: 1
FEVER: 0
SHORTNESS OF BREATH: 0
HEMATOCHEZIA: 0
DIZZINESS: 0
FREQUENCY: 0
DIARRHEA: 1
COUGH: 1
CONSTIPATION: 0
EYE PAIN: 0
PARESTHESIAS: 0
JOINT SWELLING: 0
NAUSEA: 0
ARTHRALGIAS: 1
SORE THROAT: 0
CHILLS: 0
HEMATURIA: 0

## 2023-08-04 ENCOUNTER — OFFICE VISIT (OUTPATIENT)
Dept: FAMILY MEDICINE | Facility: CLINIC | Age: 58
End: 2023-08-04
Payer: COMMERCIAL

## 2023-08-04 VITALS
OXYGEN SATURATION: 98 % | SYSTOLIC BLOOD PRESSURE: 131 MMHG | DIASTOLIC BLOOD PRESSURE: 83 MMHG | TEMPERATURE: 97.2 F | RESPIRATION RATE: 22 BRPM | HEIGHT: 72 IN | HEART RATE: 76 BPM | BODY MASS INDEX: 26.15 KG/M2 | WEIGHT: 193.1 LBS

## 2023-08-04 DIAGNOSIS — Z11.59 NEED FOR HEPATITIS C SCREENING TEST: ICD-10-CM

## 2023-08-04 DIAGNOSIS — R19.7 DIARRHEA, UNSPECIFIED TYPE: ICD-10-CM

## 2023-08-04 DIAGNOSIS — Z11.4 SCREENING FOR HIV (HUMAN IMMUNODEFICIENCY VIRUS): ICD-10-CM

## 2023-08-04 DIAGNOSIS — Z00.00 ROUTINE GENERAL MEDICAL EXAMINATION AT A HEALTH CARE FACILITY: Primary | ICD-10-CM

## 2023-08-04 LAB
ALBUMIN SERPL BCG-MCNC: 4.4 G/DL (ref 3.5–5.2)
ALP SERPL-CCNC: 123 U/L (ref 40–129)
ALT SERPL W P-5'-P-CCNC: 16 U/L (ref 0–70)
ANION GAP SERPL CALCULATED.3IONS-SCNC: 10 MMOL/L (ref 7–15)
AST SERPL W P-5'-P-CCNC: 28 U/L (ref 0–45)
BILIRUB SERPL-MCNC: 0.3 MG/DL
BUN SERPL-MCNC: 16.3 MG/DL (ref 8–23)
CALCIUM SERPL-MCNC: 9.7 MG/DL (ref 8.6–10)
CHLORIDE SERPL-SCNC: 105 MMOL/L (ref 98–107)
CHOLEST SERPL-MCNC: 235 MG/DL
CREAT SERPL-MCNC: 0.97 MG/DL (ref 0.67–1.17)
DEPRECATED HCO3 PLAS-SCNC: 25 MMOL/L (ref 22–29)
GFR SERPL CREATININE-BSD FRML MDRD: 90 ML/MIN/1.73M2
GLUCOSE SERPL-MCNC: 106 MG/DL (ref 70–99)
HBA1C MFR BLD: 5.2 % (ref 0–5.6)
HDLC SERPL-MCNC: 81 MG/DL
LDLC SERPL CALC-MCNC: 138 MG/DL
NONHDLC SERPL-MCNC: 154 MG/DL
POTASSIUM SERPL-SCNC: 4.5 MMOL/L (ref 3.4–5.3)
PROT SERPL-MCNC: 7.1 G/DL (ref 6.4–8.3)
PSA SERPL DL<=0.01 NG/ML-MCNC: 0.47 NG/ML (ref 0–3.5)
SODIUM SERPL-SCNC: 140 MMOL/L (ref 136–145)
TRIGL SERPL-MCNC: 79 MG/DL
TSH SERPL DL<=0.005 MIU/L-ACNC: 0.86 UIU/ML (ref 0.3–4.2)

## 2023-08-04 PROCEDURE — 36415 COLL VENOUS BLD VENIPUNCTURE: CPT | Performed by: INTERNAL MEDICINE

## 2023-08-04 PROCEDURE — G0103 PSA SCREENING: HCPCS | Performed by: INTERNAL MEDICINE

## 2023-08-04 PROCEDURE — 90750 HZV VACC RECOMBINANT IM: CPT | Performed by: INTERNAL MEDICINE

## 2023-08-04 PROCEDURE — 90715 TDAP VACCINE 7 YRS/> IM: CPT | Performed by: INTERNAL MEDICINE

## 2023-08-04 PROCEDURE — 80061 LIPID PANEL: CPT | Performed by: INTERNAL MEDICINE

## 2023-08-04 PROCEDURE — 99396 PREV VISIT EST AGE 40-64: CPT | Mod: 25 | Performed by: INTERNAL MEDICINE

## 2023-08-04 PROCEDURE — 90471 IMMUNIZATION ADMIN: CPT | Performed by: INTERNAL MEDICINE

## 2023-08-04 PROCEDURE — 84443 ASSAY THYROID STIM HORMONE: CPT | Performed by: INTERNAL MEDICINE

## 2023-08-04 PROCEDURE — 86803 HEPATITIS C AB TEST: CPT | Performed by: INTERNAL MEDICINE

## 2023-08-04 PROCEDURE — 90472 IMMUNIZATION ADMIN EACH ADD: CPT | Performed by: INTERNAL MEDICINE

## 2023-08-04 PROCEDURE — 87389 HIV-1 AG W/HIV-1&-2 AB AG IA: CPT | Performed by: INTERNAL MEDICINE

## 2023-08-04 PROCEDURE — 83036 HEMOGLOBIN GLYCOSYLATED A1C: CPT | Performed by: INTERNAL MEDICINE

## 2023-08-04 PROCEDURE — 99213 OFFICE O/P EST LOW 20 MIN: CPT | Mod: 25 | Performed by: INTERNAL MEDICINE

## 2023-08-04 PROCEDURE — 80053 COMPREHEN METABOLIC PANEL: CPT | Performed by: INTERNAL MEDICINE

## 2023-08-04 ASSESSMENT — ENCOUNTER SYMPTOMS
HEADACHES: 0
DIARRHEA: 1
EYE PAIN: 0
JOINT SWELLING: 0
FREQUENCY: 0
SORE THROAT: 0
ARTHRALGIAS: 1
PALPITATIONS: 0
NERVOUS/ANXIOUS: 0
HEMATURIA: 0
WEAKNESS: 0
DYSURIA: 0
NAUSEA: 0
COUGH: 1
PARESTHESIAS: 0
HEARTBURN: 1
HEMATOCHEZIA: 0
SHORTNESS OF BREATH: 0
DIZZINESS: 0
CONSTIPATION: 0
MYALGIAS: 0
CHILLS: 0
ABDOMINAL PAIN: 0
FEVER: 0

## 2023-08-04 NOTE — PROGRESS NOTES
SUBJECTIVE:   CC: John is an 58 year old who presents for preventative health visit.       8/4/2023     2:16 PM   Additional Questions   Roomed by derek   Accompanied by self       Healthy Habits:     Getting at least 3 servings of Calcium per day:  Yes    Bi-annual eye exam:  Yes    Dental care twice a year:  Yes    Sleep apnea or symptoms of sleep apnea:  None    Diet:  Regular (no restrictions)    Frequency of exercise:  1 day/week    Duration of exercise:  N/A    Taking medications regularly:  Yes    Medication side effects:  Not applicable    Additional concerns today:  Yes      Today's PHQ-2 Score:       8/4/2023     1:51 PM   PHQ-2 ( 1999 [employer])   Q1: Little interest or pleasure in doing things 0   Q2: Feeling down, depressed or hopeless 0   PHQ-2 Score 0   Q1: Little interest or pleasure in doing things Not at all   Q2: Feeling down, depressed or hopeless Not at all   PHQ-2 Score 0       Has pseudopsoriasis- has had a long time.  Has a bump on the back of his head- wondering about that.    Has strong family history of heart disease- wondering about additional screening aside from just lipid testing.      Also has pain in both ankles, left more than right- wonders about arthritis.  Is very active, physical work for his job.  Does a pretty good job with it, but when he sits for a while and gets up he feels really stiff.     Shoulders- especially right shoulder feels stiff- seems like it is gradually getting better.      Gets some acid reflux- typically bothers him right before he gets hungry- after lunch and before dinner it will hit him.  Doesn't know if there is something from his diet that is exacerbating it.    He has a lot diarrhea.  More often than not he has diarrhea.    Also coughs a lot in the morning.  Both his dad and his grandmother had choking type symptoms/swallowing wrong- he has these same symptoms. Doesn't have difficulty swallowing or that food is getting stuck in his throat.      Have you  ever done Advance Care Planning? (For example, a Health Directive, POLST, or a discussion with a medical provider or your loved ones about your wishes): No, advance care planning information given to patient to review.  Patient plans to discuss their wishes with loved ones or provider.      Social History     Tobacco Use     Smoking status: Former     Types: Cigarettes     Quit date:      Years since quittin.6     Smokeless tobacco: Never   Substance Use Topics     Alcohol use: Yes     Comment: 1-3 four days per week             2023     3:28 PM   Alcohol Use   Prescreen: >3 drinks/day or >7 drinks/week? Yes   AUDIT SCORE  7         2023     3:28 PM   AUDIT - Alcohol Use Disorders Identification Test - Reproduced from the World Health Organization Audit 2001 (Second Edition)   1.  How often do you have a drink containing alcohol? 4 or more times a week   2.  How many drinks containing alcohol do you have on a typical day when you are drinking? 1 or 2   3.  How often do you have five or more drinks on one occasion? Monthly   4.  How often during the last year have you found that you were not able to stop drinking once you had started? Never   5.  How often during the last year have you failed to do what was normally expected of you because of drinking? Never   6.  How often during the last year have you needed a first drink in the morning to get yourself going after a heavy drinking session? Never   7.  How often during the last year have you had a feeling of guilt or remorse after drinking? Less than monthly   8.  How often during the last year have you been unable to remember what happened the night before because of your drinking? Never   9.  Have you or someone else been injured because of your drinking? No   10. Has a relative, friend, doctor or other health care worker been concerned about your drinking or suggested you cut down? No   TOTAL SCORE 7       Last PSA:   Prostate Specific Antigen  "Screen   Date Value Ref Range Status   08/12/2021 0.45 0.00 - 4.00 ug/L Final       Reviewed orders with patient. Reviewed health maintenance and updated orders accordingly - Yes  Lab work is in process    Reviewed and updated as needed this visit by clinical staff   Tobacco  Allergies  Meds  Problems  Med Hx  Surg Hx  Fam Hx          Reviewed and updated as needed this visit by Provider   Tobacco  Allergies  Meds  Problems  Med Hx  Surg Hx  Fam Hx             Review of Systems   Constitutional:  Negative for chills and fever.   HENT:  Positive for hearing loss. Negative for congestion, ear pain and sore throat.    Eyes:  Negative for pain and visual disturbance.   Respiratory:  Positive for cough. Negative for shortness of breath.    Cardiovascular:  Negative for chest pain, palpitations and peripheral edema.   Gastrointestinal:  Positive for diarrhea and heartburn. Negative for abdominal pain, constipation, hematochezia and nausea.   Genitourinary:  Positive for urgency. Negative for dysuria, frequency, genital sores, hematuria, impotence and penile discharge.   Musculoskeletal:  Positive for arthralgias. Negative for joint swelling and myalgias.   Skin:  Negative for rash.   Neurological:  Negative for dizziness, weakness, headaches and paresthesias.   Psychiatric/Behavioral:  Negative for mood changes. The patient is not nervous/anxious.          OBJECTIVE:   /83 (BP Location: Right arm, Patient Position: Sitting, Cuff Size: Adult Regular)   Pulse 76   Temp 97.2  F (36.2  C) (Temporal)   Resp 22   Ht 1.834 m (6' 0.21\")   Wt 87.6 kg (193 lb 1.6 oz)   SpO2 98%   BMI 26.04 kg/m      Physical Exam  GENERAL: healthy, alert and no distress  EYES: Eyes grossly normal to inspection, PERRL and conjunctivae and sclerae normal  HENT: ear canals and TM's normal, nose and mouth without ulcers or lesions  NECK: no adenopathy, no asymmetry, masses, or scars and thyroid normal to palpation  RESP: " lungs clear to auscultation - no rales, rhonchi or wheezes  CV: regular rate and rhythm, normal S1 S2, no S3 or S4, no murmur, click or rub, no peripheral edema and peripheral pulses strong  ABDOMEN: soft, nontender, no hepatosplenomegaly, no masses and bowel sounds normal   (male): normal male genitalia without lesions or urethral discharge, no hernia  MS: no gross musculoskeletal defects noted, no edema  SKIN: no suspicious lesions or rashes  NEURO: Normal strength and tone, mentation intact and speech normal  PSYCH: mentation appears normal, affect normal/bright    Diagnostic Test Results:  Labs reviewed in Epic    ASSESSMENT/PLAN:   John was seen today for physical.    Diagnoses and all orders for this visit:    Routine general medical examination at a health care facility  -     PSA, screen; Future  -     Lipid panel reflex to direct LDL Non-fasting; Future  -     Hemoglobin A1c; Future  -     CT Coronary Calcium Scan; Future- given his strong family history, patient wanted additional evaluation of his coronary arteries that may help inform starting a statin medication if his cholesterol remains elevated  -     PSA, screen  -     Lipid panel reflex to direct LDL Non-fasting  -     Hemoglobin A1c    PSA: today    Colon: due 2024    Immunizations: Shingles and TDaP    Discussed healthy lifestyle and aging recommendations including regular exercise, adequate and regular sleep, 5+ fruits and veggies daily.    Screening for HIV (human immunodeficiency virus)  -     HIV Antigen Antibody Combo; Future  -     HIV Antigen Antibody Combo    Need for hepatitis C screening test  -     Hepatitis C Screen Reflex to HCV RNA Quant and Genotype; Future  -     Hepatitis C Screen Reflex to HCV RNA Quant and Genotype    Diarrhea, unspecified type  -     TSH with free T4 reflex; Future  -     Comprehensive metabolic panel (BMP + Alb, Alk Phos, ALT, AST, Total. Bili, TP); Future  -     TSH with free T4 reflex  -     Comprehensive  metabolic panel (BMP + Alb, Alk Phos, ALT, AST, Total. Bili, TP)   - Discussed identifying spicy foods and/or reducing alcohol to see if this helps GI symptoms  - Follow up with me to discuss this as well as joint concerns which we didn't get to today    Other orders  -     REVIEW OF HEALTH MAINTENANCE PROTOCOL ORDERS  -     ZOSTER RECOMBINANT ADJUVANTED (SHINGRIX)  -     TDAP 10-64Y (ADACEL,BOOSTRIX)        Patient has been advised of split billing requirements and indicates understanding: Yes      COUNSELING:   Reviewed preventive health counseling, as reflected in patient instructions        He reports that he quit smoking about 23 years ago. His smoking use included cigarettes. He has never used smokeless tobacco.        Serena Perez MD  [facility]

## 2023-08-04 NOTE — NURSING NOTE
Prior to immunization administration, verified patients identity using patient s name and date of birth. Please see Immunization Activity for additional information.     Screening Questionnaire for Adult Immunization    Are you sick today?   No   Do you have allergies to medications, food, a vaccine component or latex?   No   Have you ever had a serious reaction after receiving a vaccination?   No   Do you have a long-term health problem with heart, lung, kidney, or metabolic disease (e.g., diabetes), asthma, a blood disorder, no spleen, complement component deficiency, a cochlear implant, or a spinal fluid leak?  Are you on long-term aspirin therapy?   No   Do you have cancer, leukemia, HIV/AIDS, or any other immune system problem?   No   Do you have a parent, brother, or sister with an immune system problem?   No   In the past 3 months, have you taken medications that affect  your immune system, such as prednisone, other steroids, or anticancer drugs; drugs for the treatment of rheumatoid arthritis, Crohn s disease, or psoriasis; or have you had radiation treatments?   No   Have you had a seizure, or a brain or other nervous system problem?   No   During the past year, have you received a transfusion of blood or blood    products, or been given immune (gamma) globulin or antiviral drug?   No   For women: Are you pregnant or is there a chance you could become       pregnant during the next month?   No   Have you received any vaccinations in the past 4 weeks?   No     Immunization questionnaire answers were all negative.      Patient instructed to remain in clinic for 15 minutes afterwards, and to report any adverse reactions.     Screening performed by Steph Ghotra MA on 8/4/2023 at 3:03 PM.

## 2023-08-04 NOTE — PATIENT INSTRUCTIONS
- Let's check labs today to look for a cause of diarrhea    - I recommend watching your diet just to see if you can identify a pattern    - Trial famotidine (Pepcid) 20 mg daily for reflux symptoms to see if that helps your cough, your choking while eating, and your GI symptoms    - Please let me know or come back in to see me if your symptoms are not improving particularly the cough    - Come back and see me in about 3 months to see how things are going    Preventive Health Recommendations  Male Ages 50 - 64    Yearly exam:             See your health care provider every year in order to  o   Review health changes.   o   Discuss preventive care.    o   Review your medicines if your doctor has prescribed any.   Have a cholesterol test every 5 years, or more frequently if you are at risk for high cholesterol/heart disease.   Have a diabetes test (fasting glucose) every three years. If you are at risk for diabetes, you should have this test more often.   Have a colonoscopy at age 50, or have a yearly FIT test (stool test). These exams will check for colon cancer.    Talk with your health care provider about whether or not a prostate cancer screening test (PSA) is right for you.  You should be tested each year for STDs (sexually transmitted diseases), if you re at risk.     Shots: Get a flu shot each year. Get a tetanus shot every 10 years.     Nutrition:  Eat at least 5 servings of fruits and vegetables daily.   Eat whole-grain bread, whole-wheat pasta and brown rice instead of white grains and rice.   Get adequate Calcium and Vitamin D.     Lifestyle  Exercise for at least 150 minutes a week (30 minutes a day, 5 days a week). This will help you control your weight and prevent disease.   Limit alcohol to one drink per day.   No smoking.   Wear sunscreen to prevent skin cancer.   See your dentist every six months for an exam and cleaning.   See your eye doctor every 1 to 2 years.

## 2023-08-05 LAB
HCV AB SERPL QL IA: NONREACTIVE
HIV 1+2 AB+HIV1 P24 AG SERPL QL IA: NONREACTIVE

## 2023-09-18 ENCOUNTER — HOSPITAL ENCOUNTER (OUTPATIENT)
Dept: CARDIOLOGY | Facility: CLINIC | Age: 58
Discharge: HOME | End: 2023-09-18
Attending: INTERNAL MEDICINE | Admitting: INTERNAL MEDICINE
Payer: COMMERCIAL

## 2023-09-18 DIAGNOSIS — Z00.00 ROUTINE GENERAL MEDICAL EXAMINATION AT A HEALTH CARE FACILITY: ICD-10-CM

## 2023-09-18 PROCEDURE — 75571 CT HRT W/O DYE W/CA TEST: CPT

## 2023-09-18 PROCEDURE — 75571 CT HRT W/O DYE W/CA TEST: CPT | Mod: 26 | Performed by: INTERNAL MEDICINE

## 2023-10-09 ENCOUNTER — VIRTUAL VISIT (OUTPATIENT)
Dept: FAMILY MEDICINE | Facility: CLINIC | Age: 58
End: 2023-10-09
Payer: COMMERCIAL

## 2023-10-09 DIAGNOSIS — E78.5 HYPERLIPIDEMIA LDL GOAL <100: Primary | ICD-10-CM

## 2023-10-09 DIAGNOSIS — R93.1 ELEVATED CORONARY ARTERY CALCIUM SCORE: ICD-10-CM

## 2023-10-09 PROCEDURE — 99213 OFFICE O/P EST LOW 20 MIN: CPT | Mod: VID | Performed by: INTERNAL MEDICINE

## 2023-10-09 RX ORDER — ROSUVASTATIN CALCIUM 5 MG/1
5 TABLET, COATED ORAL DAILY
Qty: 90 TABLET | Refills: 3 | Status: SHIPPED | OUTPATIENT
Start: 2023-10-09 | End: 2024-06-11

## 2023-10-09 NOTE — PROGRESS NOTES
"John is a 58 year old who is being evaluated via a billable video visit.      How would you like to obtain your AVS? MyChart  If the video visit is dropped, the invitation should be resent by:   Will anyone else be joining your video visit? No          Assessment & Plan       (R93.1) Elevated coronary artery calcium score  (E78.5) Hyperlipidemia LDL goal <100  (primary encounter diagnosis)  Comment: CAC 45.7 on 9/19/23.  Recommend starting statin (moderate intensity) Crestor 5 mg daily.  Plan: rosuvastatin (CRESTOR) 5 MG tablet, Lipid panel        reflex to direct LDL Non-fasting, Comprehensive        metabolic panel (BMP + Alb, Alk Phos, ALT, AST,        Total. Bili, TP)  - Repeat labs in 3 months, schedule appointment with me after if not responding appropriately  - Discussed side effects, he will let me know if he notices any             BMI:   Estimated body mass index is 26.04 kg/m  as calculated from the following:    Height as of 8/4/23: 1.834 m (6' 0.21\").    Weight as of 8/4/23: 87.6 kg (193 lb 1.6 oz).       There are no Patient Instructions on file for this visit.    Serena Perez [facility]    Carl Lopez is a 58 year old, presenting for the following health issues:  No chief complaint on file.        8/4/2023     2:16 PM   Additional Questions   Roomed by derek   Accompanied by self       History of Present Illness       Hyperlipidemia:  He presents for follow up of hyperlipidemia.   He is not taking medication to lower cholesterol. He is not having myalgia or other side effects to statin medications.    He eats 2-3 servings of fruits and vegetables daily.He consumes 1 sweetened beverage(s) daily.He exercises with enough effort to increase his heart rate 30 to 60 minutes per day.  He exercises with enough effort to increase his heart rate 5 days per week.   He is taking medications regularly.       Had CT coronary artery calcium score that was elevated " (45.7).  Wife Krystina on the call as well.    Had two episodes of lightheadedness in the last 6 months.  Didn't pass out.  Was sitting down.  No palpitations.  No chest pain/pressure.  No sweating.  Felt vaguely nauseated.      The 10-year ASCVD risk score (Rey FRANCISCO, et al., 2019) is: 6.7%    Values used to calculate the score:      Age: 58 years      Sex: Male      Is Non- : No      Diabetic: No      Tobacco smoker: No      Systolic Blood Pressure: 131 mmHg      Is BP treated: No      HDL Cholesterol: 81 mg/dL      Total Cholesterol: 235 mg/dL      Review of Systems   Constitutional, HEENT, cardiovascular, pulmonary, gi and gu systems are negative, except as otherwise noted.      Objective           Vitals:  No vitals were obtained today due to virtual visit.    Physical Exam   GENERAL: Healthy, alert and no distress  EYES: Eyes grossly normal to inspection.  No discharge or erythema, or obvious scleral/conjunctival abnormalities.  RESP: No audible wheeze, cough, or visible cyanosis.  No visible retractions or increased work of breathing.    SKIN: Visible skin clear. No significant rash, abnormal pigmentation or lesions.  NEURO: Cranial nerves grossly intact.  Mentation and speech appropriate for age.  PSYCH: Mentation appears normal, affect normal/bright, judgement and insight intact, normal speech and appearance well-groomed.            Video-Visit Details    Type of service:  Video Visit     Originating Location (pt. Location): Home    Distant Location (provider location):  On-site  Platform used for Video Visit: [employer] - [employer]

## 2024-01-17 ENCOUNTER — LAB (OUTPATIENT)
Dept: LAB | Facility: CLINIC | Age: 59
End: 2024-01-17
Payer: COMMERCIAL

## 2024-01-17 DIAGNOSIS — E78.5 HYPERLIPIDEMIA LDL GOAL <100: ICD-10-CM

## 2024-01-17 DIAGNOSIS — R93.1 ELEVATED CORONARY ARTERY CALCIUM SCORE: ICD-10-CM

## 2024-01-17 PROCEDURE — 80053 COMPREHEN METABOLIC PANEL: CPT

## 2024-01-17 PROCEDURE — 80061 LIPID PANEL: CPT

## 2024-01-17 PROCEDURE — 36415 COLL VENOUS BLD VENIPUNCTURE: CPT

## 2024-01-18 LAB
ALBUMIN SERPL BCG-MCNC: 4.3 G/DL (ref 3.5–5.2)
ALP SERPL-CCNC: 104 U/L (ref 40–150)
ALT SERPL W P-5'-P-CCNC: 19 U/L (ref 0–70)
ANION GAP SERPL CALCULATED.3IONS-SCNC: 8 MMOL/L (ref 7–15)
AST SERPL W P-5'-P-CCNC: 23 U/L (ref 0–45)
BILIRUB SERPL-MCNC: 0.5 MG/DL
BUN SERPL-MCNC: 12.1 MG/DL (ref 8–23)
CALCIUM SERPL-MCNC: 9.6 MG/DL (ref 8.6–10)
CHLORIDE SERPL-SCNC: 106 MMOL/L (ref 98–107)
CHOLEST SERPL-MCNC: 159 MG/DL
CREAT SERPL-MCNC: 0.89 MG/DL (ref 0.67–1.17)
DEPRECATED HCO3 PLAS-SCNC: 28 MMOL/L (ref 22–29)
EGFRCR SERPLBLD CKD-EPI 2021: >90 ML/MIN/1.73M2
FASTING STATUS PATIENT QL REPORTED: YES
GLUCOSE SERPL-MCNC: 91 MG/DL (ref 70–99)
HDLC SERPL-MCNC: 72 MG/DL
LDLC SERPL CALC-MCNC: 77 MG/DL
NONHDLC SERPL-MCNC: 87 MG/DL
POTASSIUM SERPL-SCNC: 4.7 MMOL/L (ref 3.4–5.3)
PROT SERPL-MCNC: 6.9 G/DL (ref 6.4–8.3)
SODIUM SERPL-SCNC: 142 MMOL/L (ref 135–145)
TRIGL SERPL-MCNC: 51 MG/DL

## 2024-06-05 ENCOUNTER — TRANSFERRED RECORDS (OUTPATIENT)
Dept: HEALTH INFORMATION MANAGEMENT | Facility: CLINIC | Age: 59
End: 2024-06-05
Payer: COMMERCIAL

## 2024-06-11 ENCOUNTER — TELEPHONE (OUTPATIENT)
Dept: FAMILY MEDICINE | Facility: CLINIC | Age: 59
End: 2024-06-11
Payer: COMMERCIAL

## 2024-06-11 DIAGNOSIS — E78.5 HYPERLIPIDEMIA LDL GOAL <100: ICD-10-CM

## 2024-06-11 DIAGNOSIS — R93.1 ELEVATED CORONARY ARTERY CALCIUM SCORE: ICD-10-CM

## 2024-06-11 RX ORDER — ROSUVASTATIN CALCIUM 5 MG/1
5 TABLET, COATED ORAL DAILY
Qty: 90 TABLET | Refills: 0 | Status: SHIPPED | OUTPATIENT
Start: 2024-06-11 | End: 2024-09-12

## 2024-06-11 NOTE — TELEPHONE ENCOUNTER
Call received from patient:  Prescribed Rosuvastatin by Dr. Perez last year  Forgot Rosuvastatin medication while out of town  Picked up a refill through another pharmacy and this cancelled refills with local [facility]  Directed by [facility] to have PCP clinic send medication order to them to get Rosuvastatin RX reinstated and would like medication mailed to him    Per chart review, Dr. Perez ordered a one year supply of Rosuvastatin 5 mg on 10/9/23.    Writer informed patient:  Writer can send [facility] a 90 day supply with note to please profile medication and patient will contact pharmacy when needing refill  Call pharmacy to speak with a staff member when needing a refill  Follow up directly with [facility] to set up delivery    Patient verbalized understanding and in agreement with plan.    Rosuvastatin #90 sent to requested pharmacy.    MATTY HuaN, RN-[facility]

## 2024-07-05 ENCOUNTER — PATIENT OUTREACH (OUTPATIENT)
Dept: CARE COORDINATION | Facility: CLINIC | Age: 59
End: 2024-07-05
Payer: COMMERCIAL

## 2024-07-15 ENCOUNTER — OFFICE VISIT (OUTPATIENT)
Dept: URGENT CARE | Facility: URGENT CARE | Age: 59
End: 2024-07-15
Payer: COMMERCIAL

## 2024-07-15 VITALS
HEART RATE: 55 BPM | RESPIRATION RATE: 16 BRPM | OXYGEN SATURATION: 99 % | HEIGHT: 74 IN | TEMPERATURE: 97.8 F | SYSTOLIC BLOOD PRESSURE: 135 MMHG | BODY MASS INDEX: 25.28 KG/M2 | DIASTOLIC BLOOD PRESSURE: 87 MMHG | WEIGHT: 197 LBS

## 2024-07-15 DIAGNOSIS — R21 RASH AND NONSPECIFIC SKIN ERUPTION: ICD-10-CM

## 2024-07-15 DIAGNOSIS — S90.465A TICK BITE OF LESSER TOE OF LEFT FOOT, INITIAL ENCOUNTER: Primary | ICD-10-CM
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
## 2024-07-15 PROCEDURE — 99213 OFFICE O/P EST LOW 20 MIN: CPT | Performed by: EMERGENCY MEDICINE

## 2024-07-15 RX ORDER — DOXYCYCLINE 100 MG/1
100 CAPSULE ORAL 2 TIMES DAILY
Qty: 20 CAPSULE | Refills: 0 | Status: SHIPPED | OUTPATIENT
Start: 2024-07-15 | End: 2024-07-25

## 2024-07-15 NOTE — PROGRESS NOTES
"Assessment & Plan     Diagnosis:    ICD-10-CM    1. Tick bite of lesser toe of left foot, initial encounter  S90.465A doxycycline hyclate (VIBRAMYCIN) 100 MG capsule    W57.XXXA       2. Rash and nonspecific skin eruption  R21 doxycycline hyclate (VIBRAMYCIN) 100 MG capsule        Medical Decision Making:  John Rivera is a 59 year old male who presents for evaluation of a tick bite.  The tick was removed by the patient and it appears all of the tick parts have been successfully removed.  There are no signs of cellulitis at this time. There is a slightly rash and blistering in the adjacent toe. He believs this was a deer tick but is unsure. Will treat with antibiotics (doxycycline) noted above.  Given the time course and symptoms, testing for tick disease as this point is not indicated.  Patient is agreeable with this plan for full-treatment. There is no signs of abscess on physical exam.  Discussed Lyme's symptoms warranting further work up and recommended recheck with PCP next week. All questions answered.    Eduardo Oconnell PA-C  [facility]    Subjective     John Rivera is a 59 year old male who presents to clinic today for the following health issues:  Chief Complaint   Patient presents with    Tick Bite     Removed tick on left foot, rash and growth around area        HPI    Patient states that he had a tick between his toes in the left foot, removed this couple days ago, thinks it was on there 3 to 4 days ago.  He noticed a rash started to develop, the seem to spread to the other toe and there was a blood blister that popped.  He notes it does hurt a little bit, slightly red.  There is been no fevers, joint pains, red streaks going up into the foot, palpitations or other concerns.      Review of Systems    See HPI    Objective      Vitals: /87   Pulse 55   Temp 97.8  F (36.6  C) (Temporal)   Resp 16   Ht 1.88 m (6' 2\")   Wt 89.4 kg (197 lb)   SpO2 99%   BMI 25.29 kg/m  " "      Patient Vitals for the past 24 hrs:   BP Temp Temp src Pulse Resp SpO2 Height Weight   07/15/24 1153 135/87 97.8  F (36.6  C) Temporal 55 16 99 % 1.88 m (6' 2\") 89.4 kg (197 lb)       Vital signs reviewed by: Eduardo Oconnell PA-C    Physical Exam   Constitutional: Patient is alert. No acute distress.  Cardiovascular: Regular rate and rhythm  Pulmonary/Chest: Lungs are clear to auscultation throughout. Effort normal. No respiratory distress. No wheezes, rales or rhonchi.A tenderness bilaterally.  Neurological: Alert and oriented x3.   Skin: There is a ~small erythematous circular rash to the left 2-3rd joint space between the toes of the left foot. No obvious bull's-eye lesion.  No fluctuance or areas of pointing.  No lymphangitis or rash involving the mucous membranes. No petechia or purpura.   Psychiatric:The patient has a normal mood and affect.     Eduardo Oconnell PA-C, July 15, 2024      "

## 2024-09-12 DIAGNOSIS — E78.5 HYPERLIPIDEMIA LDL GOAL <100: ICD-10-CM

## 2024-09-12 DIAGNOSIS — R93.1 ELEVATED CORONARY ARTERY CALCIUM SCORE: ICD-10-CM

## 2024-09-12 RX ORDER — ROSUVASTATIN CALCIUM 5 MG/1
5 TABLET, COATED ORAL DAILY
Qty: 90 TABLET | Refills: 0 | Status: SHIPPED | OUTPATIENT
Start: 2024-09-12 | End: 2024-09-16

## 2024-09-12 SDOH — HEALTH STABILITY: PHYSICAL HEALTH
ON AVERAGE, HOW MANY DAYS PER WEEK DO YOU ENGAGE IN MODERATE TO STRENUOUS EXERCISE (LIKE A BRISK WALK)?: PATIENT DECLINED

## 2024-09-16 ENCOUNTER — OFFICE VISIT (OUTPATIENT)
Dept: FAMILY MEDICINE | Facility: CLINIC | Age: 59
End: 2024-09-16
Payer: COMMERCIAL

## 2024-09-16 VITALS
HEIGHT: 72 IN | OXYGEN SATURATION: 98 % | TEMPERATURE: 98.4 F | DIASTOLIC BLOOD PRESSURE: 68 MMHG | BODY MASS INDEX: 26.28 KG/M2 | SYSTOLIC BLOOD PRESSURE: 132 MMHG | HEART RATE: 63 BPM | WEIGHT: 194 LBS

## 2024-09-16 DIAGNOSIS — R93.1 ELEVATED CORONARY ARTERY CALCIUM SCORE: ICD-10-CM

## 2024-09-16 DIAGNOSIS — L72.3 SEBACEOUS CYST: ICD-10-CM

## 2024-09-16 DIAGNOSIS — E78.5 HYPERLIPIDEMIA LDL GOAL <100: ICD-10-CM

## 2024-09-16 DIAGNOSIS — Z00.00 ROUTINE GENERAL MEDICAL EXAMINATION AT A HEALTH CARE FACILITY: Primary | ICD-10-CM

## 2024-09-16 DIAGNOSIS — Z12.5 SCREENING FOR PROSTATE CANCER: ICD-10-CM

## 2024-09-16 DIAGNOSIS — Z12.11 SCREEN FOR COLON CANCER: ICD-10-CM

## 2024-09-16 LAB
ALBUMIN SERPL BCG-MCNC: 4.2 G/DL (ref 3.5–5.2)
ALP SERPL-CCNC: 120 U/L (ref 40–150)
ALT SERPL W P-5'-P-CCNC: 20 U/L (ref 0–70)
ANION GAP SERPL CALCULATED.3IONS-SCNC: 7 MMOL/L (ref 7–15)
AST SERPL W P-5'-P-CCNC: 26 U/L (ref 0–45)
BILIRUB SERPL-MCNC: 0.5 MG/DL
BUN SERPL-MCNC: 14.3 MG/DL (ref 8–23)
CALCIUM SERPL-MCNC: 9.4 MG/DL (ref 8.8–10.4)
CHLORIDE SERPL-SCNC: 103 MMOL/L (ref 98–107)
CHOLEST SERPL-MCNC: 178 MG/DL
CREAT SERPL-MCNC: 0.86 MG/DL (ref 0.67–1.17)
EGFRCR SERPLBLD CKD-EPI 2021: >90 ML/MIN/1.73M2
FASTING STATUS PATIENT QL REPORTED: NO
FASTING STATUS PATIENT QL REPORTED: NO
GLUCOSE SERPL-MCNC: 89 MG/DL (ref 70–99)
HCO3 SERPL-SCNC: 29 MMOL/L (ref 22–29)
HDLC SERPL-MCNC: 76 MG/DL
LDLC SERPL CALC-MCNC: 87 MG/DL
NONHDLC SERPL-MCNC: 102 MG/DL
POTASSIUM SERPL-SCNC: 4.6 MMOL/L (ref 3.4–5.3)
PROT SERPL-MCNC: 7 G/DL (ref 6.4–8.3)
PSA SERPL DL<=0.01 NG/ML-MCNC: 0.55 NG/ML (ref 0–4.5)
SODIUM SERPL-SCNC: 139 MMOL/L (ref 135–145)
TRIGL SERPL-MCNC: 77 MG/DL

## 2024-09-16 PROCEDURE — 99214 OFFICE O/P EST MOD 30 MIN: CPT | Mod: 25 | Performed by: INTERNAL MEDICINE

## 2024-09-16 PROCEDURE — 80053 COMPREHEN METABOLIC PANEL: CPT | Performed by: INTERNAL MEDICINE

## 2024-09-16 PROCEDURE — 99396 PREV VISIT EST AGE 40-64: CPT | Performed by: INTERNAL MEDICINE

## 2024-09-16 PROCEDURE — G0103 PSA SCREENING: HCPCS | Performed by: INTERNAL MEDICINE

## 2024-09-16 PROCEDURE — 36415 COLL VENOUS BLD VENIPUNCTURE: CPT | Performed by: INTERNAL MEDICINE

## 2024-09-16 PROCEDURE — 80061 LIPID PANEL: CPT | Performed by: INTERNAL MEDICINE

## 2024-09-16 RX ORDER — MAGNESIUM GLYCINATE 100 MG
200 CAPSULE ORAL DAILY
COMMUNITY
Start: 2024-09-16

## 2024-09-16 RX ORDER — ROSUVASTATIN CALCIUM 5 MG/1
5 TABLET, COATED ORAL DAILY
Qty: 90 TABLET | Refills: 3 | Status: SHIPPED | OUTPATIENT
Start: 2024-09-16

## 2024-09-16 ASSESSMENT — PAIN SCALES - GENERAL: PAINLEVEL: MILD PAIN (2)

## 2024-09-16 NOTE — PROGRESS NOTES
Preventive Care Visit  [facility]  Serena Perez DO, Internal Medicine  Sep 16, 2024      Assessment & Plan     (Z00.00) Routine general medical examination at a health care facility  (primary encounter diagnosis)  Comment:   Plan: Lipid panel reflex to direct LDL Non-fasting  PSA (start age 50, q1-2 yrs): Due  Colon: Due 12/12/24  Immunizations: Will schedule shingles, PCV; flu and COVID this fall  Labs: Lipids, Diabetes screen   Discussed healthy lifestyle and aging recommendations including regular exercise, adequate and regular sleep, 5+ fruits and veggies daily.    (E78.5) Hyperlipidemia LDL goal <100  Comment: CAC 45.7 on 9/19/23. Started statin 2023 (moderate intensity) Crestor 5 mg daily.  Check labs today.  Plan: rosuvastatin (CRESTOR) 5 MG tablet    (R93.1) Elevated coronary artery calcium score  Comment: CAC 45.7 on 9/19/23.  Recommend starting statin (moderate intensity) Crestor 5 mg daily.  Plan: Lipid panel reflex to direct LDL Non-fasting,         Comprehensive metabolic panel (BMP + Alb, Alk         Phos, ALT, AST, Total. Bili, TP), rosuvastatin         (CRESTOR) 5 MG tablet    (Z12.11) Screen for colon cancer  Comment:   Plan: Colonoscopy Screening  Referral    (Z12.5) Screening for prostate cancer  Comment:   Plan: PSA, screen    (L72.3) Sebaceous cyst  Comment: Posterior head- painful, would like derm referral for removal- placed.  Plan: Adult Dermatology  Referral       Patient has been advised of split billing requirements and indicates understanding: Yes        BMI  Estimated body mass index is 26.31 kg/m  as calculated from the following:    Height as of this encounter: 1.829 m (6').    Weight as of this encounter: 88 kg (194 lb).       Counseling  Appropriate preventive services were addressed with this patient via screening, questionnaire, or discussion as appropriate for fall prevention, nutrition, physical activity, Tobacco-use  cessation, social engagement, weight loss and cognition.  Checklist reviewing preventive services available has been given to the patient.  Reviewed patient's diet, addressing concerns and/or questions.   The patient was instructed to see the dentist every 6 months.   He is at risk for psychosocial distress and has been provided with information to reduce risk.       Patient Instructions   I recommend getting the shingles vaccine when it is convenient for you.  The side effects can be low grade fevers and muscle aches that resolve within about 24 hours.  I also recommend getting the Prevnar 20 (pneumonia) vaccine.    I recommend getting the updated COVID vaccine and flu vaccine this fall.        Patient Education  Preventive Care Advice   This is general advice given by our system to help you stay healthy. However, your care team may have specific advice just for you. Please talk to your care team about your preventive care needs.  Nutrition  Eat 5 or more servings of fruits and vegetables each day.  Try wheat bread, brown rice and whole grain pasta (instead of white bread, rice, and pasta).  Get enough calcium and vitamin D. Check the label on foods and aim for 100% of the RDA (recommended daily allowance).  Lifestyle  Exercise at least 150 minutes each week  (30 minutes a day, 5 days a week).  Do muscle strengthening activities 2 days a week. These help control your weight and prevent disease.  No smoking.  Wear sunscreen to prevent skin cancer.  Have a dental exam and cleaning every 6 months.  Yearly exams  See your health care team every year to talk about:  Any changes in your health.  Any medicines your care team has prescribed.  Preventive care, family planning, and ways to prevent chronic diseases.  Shots (vaccines)   HPV shots (up to age 26), if you've never had them before.  Hepatitis B shots (up to age 59), if you've never had them before.  COVID-19 shot: Get this shot when it's due.  Flu shot: Get a flu  shot every year.  Tetanus shot: Get a tetanus shot every 10 years.  Pneumococcal, hepatitis A, and RSV shots: Ask your care team if you need these based on your risk.  Shingles shot (for age 50 and up)  General health tests  Diabetes screening:  Starting at age 35, Get screened for diabetes at least every 3 years.  If you are younger than age 35, ask your care team if you should be screened for diabetes.  Cholesterol test: At age 39, start having a cholesterol test every 5 years, or more often if advised.  Bone density scan (DEXA): At age 50, ask your care team if you should have this scan for osteoporosis (brittle bones).  Hepatitis C: Get tested at least once in your life.  STIs (sexually transmitted infections)  Before age 24: Ask your care team if you should be screened for STIs.  After age 24: Get screened for STIs if you're at risk. You are at risk for STIs (including HIV) if:  You are sexually active with more than one person.  You don't use condoms every time.  You or a partner was diagnosed with a sexually transmitted infection.  If you are at risk for HIV, ask about PrEP medicine to prevent HIV.  Get tested for HIV at least once in your life, whether you are at risk for HIV or not.  Cancer screening tests  Cervical cancer screening: If you have a cervix, begin getting regular cervical cancer screening tests starting at age 21.  Breast cancer scan (mammogram): If you've ever had breasts, begin having regular mammograms starting at age 40. This is a scan to check for breast cancer.  Colon cancer screening: It is important to start screening for colon cancer at age 45.  Have a colonoscopy test every 10 years (or more often if you're at risk) Or, ask your provider about stool tests like a FIT test every year or Cologuard test every 3 years.  To learn more about your testing options, visit:   .  For help making a decision, visit:   https://bit.ly/jx51508.  Prostate cancer screening test: If you have a prostate,  ask your care team if a prostate cancer screening test (PSA) at age 55 is right for you.  Lung cancer screening: If you are a current or former smoker ages 50 to 80, ask your care team if ongoing lung cancer screenings are right for you.  For informational purposes only. Not to replace the advice of your health care provider. Copyright   2023 [facility]. All rights reserved. Clinically reviewed by the [facility]itions Program. [employer] [id number] - REV 01/24.       Carl Lopez is a 59 year old, presenting for the following:  Physical, Mass (Lump on back of head/neck started becoming painful over the last couple weeks ), and Refill Request (rosuvastatin (CRESTOR) 5 MG tablet)        9/16/2024     9:22 AM   Additional Questions   Roomed by [facility] Directive  Patient does not have a Health Care Directive or Living Will: Discussed advance care planning with patient; information given to patient to review.    HPI    Has had some back and knee issues- this summer was brutal in terms of working really hard.  Has gone to [facility]- knee was x-ray'ed- didn't think he had any further damage.  Had cortisone shot which was miraculous for a little while.  That has been on and off.  Has also had some sciatic nerve issues- was seen at [facility] for his back.  Had some similar issues back when he had his back surgery- current symptoms are not as bad as they were before back surgery.  Symptoms seem to be triggered by walking and a lot of standing.  Feels pain start in his hip and then actually wraps around lower leg to the top of his foot- this kind of pain comes and goes.  No weakness.  No numbness or tingling.    Having decreased range of motion of left knee as well.    Also has small cyst on the back of his head- has been increasing in size. Would like to have this removed because is painful.     Still taking Crestor- no side effects.  Also taking magnesium.        9/12/2024   General Health    How would you rate your overall physical health? Good   Feel stress (tense, anxious, or unable to sleep) Only a little      (!) STRESS CONCERN      9/12/2024   Nutrition   Three or more servings of calcium each day? (!) I DON'T KNOW   Diet: Regular (no restrictions)   How many servings of fruit and vegetables per day? (!) 2-3   How many sweetened beverages each day? 0-1            9/12/2024   Exercise   Days per week of moderate/strenous exercise Patient declined   Average minutes spent exercising at this level Patient declined            9/12/2024   Social Factors   Frequency of gathering with friends or relatives Patient declined   Worry food won't last until get money to buy more No   Food not last or not have enough money for food? No   Do you have housing? (Housing is defined as stable permanent housing and does not include staying ouside in a car, in a tent, in an abandoned building, in an overnight shelter, or couch-surfing.) Yes   Are you worried about losing your housing? No   Lack of transportation? No   Unable to get utilities (heat,electricity)? No            9/12/2024   Fall Risk   Fallen 2 or more times in the past year? No   Trouble with walking or balance? No             9/12/2024   Dental   Dentist two times every year? (!) NO            9/12/2024   TB Screening   Were you born outside of the [place]? No            Today's PHQ-2 Score:       9/16/2024     9:15 AM   PHQ-2 ( 1999 [employer])   Q1: Little interest or pleasure in doing things 0   Q2: Feeling down, depressed or hopeless 0   PHQ-2 Score 0   Q1: Little interest or pleasure in doing things Not at all   Q2: Feeling down, depressed or hopeless Not at all   PHQ-2 Score 0           9/12/2024   Substance Use   Alcohol more than 3/day or more than 7/wk No   Do you use any other substances recreationally? (!) CANNABIS PRODUCTS        Social History     Tobacco Use    Smoking status: Former     Current packs/day: 0.00     Types: Cigarettes     Quit date:  2000     Years since quittin.7    Smokeless tobacco: Never   Vaping Use    Vaping status: Never Used   Substance Use Topics    Alcohol use: Yes     Comment: 1-3 four days per week    Drug use: Yes     Types: Marijuana     Comment: beverages, every 2 weeks           2024   STI Screening   New sexual partner(s) since last STI/HIV test? No      Last PSA:   Prostate Specific Antigen Screen   Date Value Ref Range Status   2023 0.47 0.00 - 3.50 ng/mL Final   2021 0.45 0.00 - 4.00 ug/L Final     ASCVD Risk   The 10-year ASCVD risk score (Rey FRANCISCO, et al., 2019) is: 5.7%    Values used to calculate the score:      Age: 59 years      Sex: Male      Is Non- : No      Diabetic: No      Tobacco smoker: No      Systolic Blood Pressure: 132 mmHg      Is BP treated: No      HDL Cholesterol: 72 mg/dL      Total Cholesterol: 159 mg/dL    Fracture Risk Assessment Tool  Link to Frax Calculator  Use the information below to complete the Frax calculator  : 1965  Sex: male  Weight (kg): 88 kg (actual weight)  Height (cm): 182.9 cm  Previous Fragility Fracture:  No  History of parent with fractured hip:  No  Current Smoking:  No  Patient has been on glucocorticoids for more than 3 months (5mg/day or more): No  Rheumatoid Arthritis on Problem List:  No  Secondary Osteoporosis on Problem List:  No  Consumes 3 or more units of alcohol per day: No  Femoral Neck BMD (g/cm2)           Reviewed and updated as needed this visit by Provider   Tobacco  Allergies  Meds  Problems  Med Hx  Surg Hx  Fam Hx            Past Medical History:   Diagnosis Date    NO ACTIVE PROBLEMS      Past Surgical History:   Procedure Laterality Date    COLONOSCOPY      KNEE SURGERY Left     LAMINECT/DISCECTOMY, LUMBAR  1992     Lab work is in process         Objective    Exam  /68 (BP Location: Right arm, Patient Position: Chair, Cuff Size: Adult Regular)   Pulse 63   Temp 98.4  F (36.9  C)  (Oral)   Ht 1.829 m (6')   Wt 88 kg (194 lb)   SpO2 98%   BMI 26.31 kg/m     Estimated body mass index is 26.31 kg/m  as calculated from the following:    Height as of this encounter: 1.829 m (6').    Weight as of this encounter: 88 kg (194 lb).    Physical Exam  GENERAL: alert and no distress  EYES: Eyes grossly normal to inspection, PERRL and conjunctivae and sclerae normal  HENT: ear canals and TM's normal, nose and mouth without ulcers or lesions  NECK: no adenopathy, no asymmetry, masses, or scars; tense cyst posterior scalp, painful  RESP: lungs clear to auscultation - no rales, rhonchi or wheezes  CV: regular rate and rhythm, normal S1 S2, no S3 or S4, no murmur, click or rub, no peripheral edema  ABDOMEN: soft, nontender, no hepatosplenomegaly, no masses and bowel sounds normal  MS: no gross musculoskeletal defects noted, no edema; reduced ROM left knee with flexion  SKIN: no suspicious lesions or rashes  NEURO: Normal strength and tone, mentation intact and speech normal  PSYCH: mentation appears normal, affect normal/bright        Signed Electronically by: Serena Perez DO

## 2024-09-16 NOTE — PATIENT INSTRUCTIONS
I recommend getting the shingles vaccine when it is convenient for you.  The side effects can be low grade fevers and muscle aches that resolve within about 24 hours.  I also recommend getting the Prevnar 20 (pneumonia) vaccine.    I recommend getting the updated COVID vaccine and flu vaccine this fall.        Patient Education   Preventive Care Advice   This is general advice given by our system to help you stay healthy. However, your care team may have specific advice just for you. Please talk to your care team about your preventive care needs.  Nutrition  Eat 5 or more servings of fruits and vegetables each day.  Try wheat bread, brown rice and whole grain pasta (instead of white bread, rice, and pasta).  Get enough calcium and vitamin D. Check the label on foods and aim for 100% of the RDA (recommended daily allowance).  Lifestyle  Exercise at least 150 minutes each week  (30 minutes a day, 5 days a week).  Do muscle strengthening activities 2 days a week. These help control your weight and prevent disease.  No smoking.  Wear sunscreen to prevent skin cancer.  Have a dental exam and cleaning every 6 months.  Yearly exams  See your health care team every year to talk about:  Any changes in your health.  Any medicines your care team has prescribed.  Preventive care, family planning, and ways to prevent chronic diseases.  Shots (vaccines)   HPV shots (up to age 26), if you've never had them before.  Hepatitis B shots (up to age 59), if you've never had them before.  COVID-19 shot: Get this shot when it's due.  Flu shot: Get a flu shot every year.  Tetanus shot: Get a tetanus shot every 10 years.  Pneumococcal, hepatitis A, and RSV shots: Ask your care team if you need these based on your risk.  Shingles shot (for age 50 and up)  General health tests  Diabetes screening:  Starting at age 35, Get screened for diabetes at least every 3 years.  If you are younger than age 35, ask your care team if you should be  screened for diabetes.  Cholesterol test: At age 39, start having a cholesterol test every 5 years, or more often if advised.  Bone density scan (DEXA): At age 50, ask your care team if you should have this scan for osteoporosis (brittle bones).  Hepatitis C: Get tested at least once in your life.  STIs (sexually transmitted infections)  Before age 24: Ask your care team if you should be screened for STIs.  After age 24: Get screened for STIs if you're at risk. You are at risk for STIs (including HIV) if:  You are sexually active with more than one person.  You don't use condoms every time.  You or a partner was diagnosed with a sexually transmitted infection.  If you are at risk for HIV, ask about PrEP medicine to prevent HIV.  Get tested for HIV at least once in your life, whether you are at risk for HIV or not.  Cancer screening tests  Cervical cancer screening: If you have a cervix, begin getting regular cervical cancer screening tests starting at age 21.  Breast cancer scan (mammogram): If you've ever had breasts, begin having regular mammograms starting at age 40. This is a scan to check for breast cancer.  Colon cancer screening: It is important to start screening for colon cancer at age 45.  Have a colonoscopy test every 10 years (or more often if you're at risk) Or, ask your provider about stool tests like a FIT test every year or Cologuard test every 3 years.  To learn more about your testing options, visit:   .  For help making a decision, visit:   https://bit.ly/mf22708.  Prostate cancer screening test: If you have a prostate, ask your care team if a prostate cancer screening test (PSA) at age 55 is right for you.  Lung cancer screening: If you are a current or former smoker ages 50 to 80, ask your care team if ongoing lung cancer screenings are right for you.  For informational purposes only. Not to replace the advice of your health care provider. Copyright   2023 [employer]. All rights  reserved. Clinically reviewed by the [facility] Transitions Program. [employer] [id number] - REV 01/24.

## 2024-09-19 ENCOUNTER — ALLIED HEALTH/NURSE VISIT (OUTPATIENT)
Dept: FAMILY MEDICINE | Facility: CLINIC | Age: 59
End: 2024-09-19
Payer: COMMERCIAL

## 2024-09-19 DIAGNOSIS — Z23 NEED FOR SHINGLES VACCINE: ICD-10-CM

## 2024-09-19 DIAGNOSIS — Z23 NEED FOR VACCINATION: Primary | ICD-10-CM

## 2024-09-19 PROCEDURE — 90472 IMMUNIZATION ADMIN EACH ADD: CPT

## 2024-09-19 PROCEDURE — 90471 IMMUNIZATION ADMIN: CPT

## 2024-09-19 PROCEDURE — 99207 PR NO CHARGE NURSE ONLY: CPT

## 2024-09-19 PROCEDURE — 90677 PCV20 VACCINE IM: CPT

## 2024-09-19 PROCEDURE — 90750 HZV VACC RECOMBINANT IM: CPT

## 2024-10-15 ENCOUNTER — TELEPHONE (OUTPATIENT)
Dept: GASTROENTEROLOGY | Facility: CLINIC | Age: 59
End: 2024-10-15
Payer: COMMERCIAL

## 2024-10-15 NOTE — TELEPHONE ENCOUNTER
"Endoscopy Scheduling Screen    Have you had any respiratory illness or flu-like symptoms in the last 10 days?  Yes (Schedule at least 10 days from symptom onset)    What is your communication preference for Instructions and/or Bowel Prep?   MyChart    What insurance is in the chart?  Other:  [facility]    Ordering/Referring Provider: Serena Perez DO   (If ordering provider performs procedure, schedule with ordering provider unless otherwise instructed. )    BMI: Estimated body mass index is 26.31 kg/m  as calculated from the following:    Height as of 9/16/24: 1.829 m (6').    Weight as of 9/16/24: 88 kg (194 lb).     Sedation Ordered  moderate sedation.   If patient BMI > 50 do not schedule in [facility].    If patient BMI > 45 do not schedule at [facility].    Are you taking methadone or Suboxone?  NO, No RN review required.    Have you been diagnosed and are being treated for severe PTSD or severe anxiety?  NO, No RN review required.    Are you taking any prescription medications for pain 3 or more times per week?   NO, No RN review required.    Do you have a history of malignant hyperthermia?  No    (Females) Are you currently pregnant?   No     Have you been diagnosed or told you have pulmonary hypertension?   No    Do you have an LVAD?  No    Have you been told you have moderate to severe sleep apnea?  No.    Have you been told you have COPD, asthma, or any other lung disease?  No    Do you have any heart conditions?  No     Have you ever had or are you waiting for an organ transplant?  No. Continue scheduling, no site restrictions.    Have you had a stroke or transient ischemic attack (TIA aka \"mini stroke\" in the last 6 months?   No    Have you been diagnosed with or been told you have cirrhosis of the liver?   No.    Are you currently on dialysis?   No    Do you need assistance transferring?   No    BMI: Estimated body mass index is 26.31 kg/m  as calculated from the following:    Height as of 9/16/24: 1.829 m " (6').    Weight as of 9/16/24: 88 kg (194 lb).     Is patients BMI > 40 and scheduling location [facility]?  No    Do you take an injectable or oral medication for weight loss or diabetes (excluding insulin)?  No    Do you take the medication Naltrexone?  No    Do you take blood thinners?  No       Prep   Are you currently on dialysis or do you have chronic kidney disease?  No    Do you have a diagnosis of diabetes?  No    Do you have a diagnosis of cystic fibrosis (CF)?  No    On a regular basis do you go 3 -5 days between bowel movements?  No    BMI > 40?  No    Preferred Pharmacy:    [id number] [address] [facility] [address] [facility] & [address] [place] [id number]  Phone: [phone] Fax: [phone]      Final Scheduling Details     Procedure scheduled  Colonoscopy    Surgeon:  Naren     Date of procedure:  12/12/2024     Pre-OP / PAC:   No - Not required for this site.    Location  [facility] - Patient preference.    Sedation   Moderate Sedation - Per order.      Patient Reminders:   You will receive a call from a Nurse to review instructions and health history.  This assessment must be completed prior to your procedure.  Failure to complete the Nurse assessment may result in the procedure being cancelled.      On the day of your procedure, please designate an adult(s) who can drive you home stay with you for the next 24 hours. The medicines used in the exam will make you sleepy. You will not be able to drive.      You cannot take public transportation, ride share services, or non-medical taxi service without a responsible caregiver.  Medical transport services are allowed with the requirement that a responsible caregiver will receive you at your destination.  We require that drivers and caregivers are confirmed prior to your procedure.

## 2024-11-21 ENCOUNTER — MYC MEDICAL ADVICE (OUTPATIENT)
Dept: FAMILY MEDICINE | Facility: CLINIC | Age: 59
End: 2024-11-21
Payer: COMMERCIAL

## 2024-11-21 DIAGNOSIS — M79.10 MUSCLE PAIN: Primary | ICD-10-CM

## 2024-11-26 NOTE — TELEPHONE ENCOUNTER
Dr. Perez - see MyChart, pt concerned this may be a side effect of his statin. Appreciate advisement.    MATTY ParkerN, RN (she/her)  [facility] RN

## 2024-11-27 ENCOUNTER — TELEPHONE (OUTPATIENT)
Dept: GASTROENTEROLOGY | Facility: CLINIC | Age: 59
End: 2024-11-27
Payer: COMMERCIAL

## 2024-11-27 NOTE — TELEPHONE ENCOUNTER
Relayed POC to patient via [employer].    MATTY ParkerN, RN (she/her)  [facility] RN

## 2024-11-27 NOTE — TELEPHONE ENCOUNTER
Dr. Perez - pt would like to know if he should taper down off statin for 2 week trial, see [facility]vin.    ELI Carson, MATTYN, RN (she/her)  [facility] RN

## 2024-11-27 NOTE — TELEPHONE ENCOUNTER
Continue lipitor    Pre assessment completed for upcoming procedure.   (Please see previous telephone encounter notes for complete details)    Patient  returned call.       Procedure details:    Arrival time and facility location reviewed.    Pre op exam needed? No.    Designated  policy reviewed. Instructed to have someone stay 6  hours post procedure.       Medication review:    Cannabis/Marijuana : STOP night before procedure.      Prep for procedure:     Procedure prep instructions reviewed.        Any additional information needed:  N/A      Patient  verbalized understanding and had no questions or concerns at this time.      Kya Vyas RN  Endoscopy Procedure Pre Assessment   [phone] option 2

## 2024-11-27 NOTE — TELEPHONE ENCOUNTER
Attempted to contact patient in order to complete pre assessment questions.     No answer. Left message to return call to [phone] option 2.    Callback required communication sent via [employer].      Melba Keller RN  Endoscopy Procedure Pre Assessment

## 2024-11-27 NOTE — TELEPHONE ENCOUNTER
Pre visit planning completed.      Procedure details:    Patient scheduled for Colonoscopy on 12-12-24.     Arrival time: 0945. Procedure time 1030    Facility location: [facility]; [facility] [place] [address]. Check in location: [facility].     Sedation type: Conscious sedation     Pre op exam needed? No.    Indication for procedure: screening      Chart review:     Electronic implanted devices? No    Recent diagnosis of diverticulitis within the last 6 weeks? No      Medication review:    Diabetic? No    Anticoagulants? No    Weight loss medication/injectable? No.    Other medication HOLDING recommendations:  Cannabis/Marijuana: Stop night before procedure.      Prep for procedure:     Bowel prep recommendation: Standard Miralax  Due to: standard bowel prep.    Prep instructions sent via [employer] 11/27/24            Marley De Leon RN  Endoscopy Procedure Pre Assessment   [phone] option 2

## 2024-11-27 NOTE — TELEPHONE ENCOUNTER
There is not a perfect way to know for sure- but stopping the medicine for 2 weeks and seeing if pain improves can be really helpful- and if the pain DOES get better, then we should definitely switch to a lower side effect option.  This is usually my recommendation to assess for the side effect.  If prefers not to, we can check labs but sometimes the labs are not abnormal even if the statin is causing the muscle aches.  I don't want him pushing through possible side effects though, so I do advocate either stopping and letting us know if the muscle aches do/don't get better OR checking labs (placed just in case).

## 2024-11-28 NOTE — TELEPHONE ENCOUNTER
He is ok to stop this one without tapering- on very low dose!    Thank you!     Serena Perez,   Internal Medicine - Pediatrics Physician  [facility]

## 2024-12-12 ENCOUNTER — HOSPITAL ENCOUNTER (OUTPATIENT)
Facility: CLINIC | Age: 59
Discharge: HOME | End: 2024-12-12
Attending: COLON & RECTAL SURGERY | Admitting: COLON & RECTAL SURGERY
Payer: COMMERCIAL

## 2024-12-12 VITALS
HEIGHT: 72 IN | OXYGEN SATURATION: 98 % | DIASTOLIC BLOOD PRESSURE: 75 MMHG | HEART RATE: 49 BPM | WEIGHT: 190 LBS | BODY MASS INDEX: 25.73 KG/M2 | SYSTOLIC BLOOD PRESSURE: 112 MMHG | RESPIRATION RATE: 9 BRPM

## 2024-12-12 LAB — COLONOSCOPY: NORMAL

## 2024-12-12 PROCEDURE — G0500 MOD SEDAT ENDO SERVICE >5YRS: HCPCS | Performed by: COLON & RECTAL SURGERY

## 2024-12-12 PROCEDURE — 88305 TISSUE EXAM BY PATHOLOGIST: CPT | Mod: 26 | Performed by: PATHOLOGY

## 2024-12-12 PROCEDURE — 250N000011 HC RX IP 250 OP 636: Performed by: COLON & RECTAL SURGERY

## 2024-12-12 PROCEDURE — 45385 COLONOSCOPY W/LESION REMOVAL: CPT | Performed by: COLON & RECTAL SURGERY

## 2024-12-12 PROCEDURE — 88305 TISSUE EXAM BY PATHOLOGIST: CPT | Mod: TC | Performed by: COLON & RECTAL SURGERY

## 2024-12-12 RX ORDER — FENTANYL CITRATE 50 UG/ML
INJECTION, SOLUTION INTRAMUSCULAR; INTRAVENOUS PRN
Status: DISCONTINUED | OUTPATIENT
Start: 2024-12-12 | End: 2024-12-12 | Stop reason: HOSPADM

## 2024-12-12 ASSESSMENT — ACTIVITIES OF DAILY LIVING (ADL)
ADLS_ACUITY_SCORE: 41

## 2024-12-12 NOTE — H&P
Colon & Rectal Surgery History and Physical  Pre-Endoscopy Procedure Note    History of Present Illness   I have been asked by Dr. Perez to evaluate this 59 year old male for colorectal cancer screening. He currently denies any abdominal pain, weight loss, bleeding per rectum, or recent change in bowel habits.    Past Medical History  Diagnosis Date    High cholesterol        Past Surgical History  Procedure Laterality Date    KNEE SURGERY Left     LAMINECT/DISCECTOMY, LUMBAR  1992        Medications  Medication Sig    magnesium glycinate 100 MG CAPS capsule Take 2 capsules (200 mg) by mouth daily.    rosuvastatin (CRESTOR) 5 MG tablet Take 1 tablet (5 mg) by mouth daily.       Allergies   No Known Allergies     Family History   Family history includes Brain Cancer in his mother; Cancer in his father; Cerebrovascular Disease in his maternal grandfather and maternal grandmother; Heart Disease in his mother and paternal grandfather; Hypertension in his father and mother; Myocardial Infarction in his father and maternal grandfather; Neurodegenerative disease in his father; Other Cancer in his mother; Rectal Cancer in his paternal grandmother.     Social History   He reports that he quit smoking about 24 years ago. His smoking use included cigarettes. He has never used smokeless tobacco. He reports current alcohol use. He reports current drug use. Drug: Marijuana.    Review of Systems   Constitutional:  No fever, weight change or fatigue.    Eyes:     No dry eyes or vision changes.   Ears/Nose/Throat/Neck:  No oral ulcers, sore throat or voice change.    Cardiovascular:   No palpitations, syncope, angina or edema.   Respiratory:    No chest pain, excessive sleepiness, shortness of breath or hemoptysis.    Gastrointestinal:   No abdominal pain, nausea, vomiting, diarrhea or heartburn.    Genitourinary:   No dysuria, hematuria, urinary retention or urinary frequency.   Musculoskeletal:  No joint swelling or  arthralgias.    Dermatologic:  No skin rash or other skin changes.   Neurologic:    No focal weakness or numbness. No neuropathy.   Psychiatric:    No depression, anxiety, suicidal ideation, or paranoid ideation.   Endocrine:   No cold or heat intolerance, polydipsia, hirsutism, change in libido, or flushing.   Hematology/Lymphatic:  No bleeding or lymphadenopathy.    Allergy/Immunology:  No rhinitis or hives.     Physical Exam   Vitals:  Blood pressure 160/82, pulse 58, resp. rate 14, height 1.829 m (6'), weight 86.2 kg (190 lb), SpO2 99%.    General:  Alert and oriented to person, place and time   Airway: Normal oropharyngeal airway and neck mobility   Lungs:  Clear bilaterally   Heart:  Regular rate and rhythm   Abdomen: Soft, NT, ND, no masses   Extremities: Warm, good capillary refill    ASA Grade: II (mild systemic disease)    Impression: Cleared for use of conscious sedation for colorectal cancer screening    Plan: Proceed with colonoscopy     Maisha Sneed MD  [facility]  [phone]

## 2024-12-13 LAB
PATH REPORT.COMMENTS IMP SPEC: NORMAL
PATH REPORT.COMMENTS IMP SPEC: NORMAL
PATH REPORT.FINAL DX SPEC: NORMAL
PATH REPORT.GROSS SPEC: NORMAL
PATH REPORT.MICROSCOPIC SPEC OTHER STN: NORMAL
PATH REPORT.RELEVANT HX SPEC: NORMAL
PHOTO IMAGE: NORMAL

## 2024-12-27 PROBLEM — D12.6 ADENOMATOUS COLON POLYP: Status: ACTIVE | Noted: 2024-12-27

## 2025-01-28 ENCOUNTER — MYC REFILL (OUTPATIENT)
Dept: FAMILY MEDICINE | Facility: CLINIC | Age: 60
End: 2025-01-28
Payer: COMMERCIAL

## 2025-01-28 DIAGNOSIS — R93.1 ELEVATED CORONARY ARTERY CALCIUM SCORE: ICD-10-CM

## 2025-01-28 DIAGNOSIS — E78.5 HYPERLIPIDEMIA LDL GOAL <100: ICD-10-CM

## 2025-01-29 RX ORDER — ROSUVASTATIN CALCIUM 5 MG/1
5 TABLET, COATED ORAL DAILY
Qty: 90 TABLET | Refills: 3 | OUTPATIENT
Start: 2025-01-29

## 2025-02-06 ENCOUNTER — TRANSFERRED RECORDS (OUTPATIENT)
Dept: HEALTH INFORMATION MANAGEMENT | Facility: CLINIC | Age: 60
End: 2025-02-06
Payer: COMMERCIAL

## 2025-02-18 ENCOUNTER — TRANSFERRED RECORDS (OUTPATIENT)
Dept: HEALTH INFORMATION MANAGEMENT | Facility: CLINIC | Age: 60
End: 2025-02-18
Payer: COMMERCIAL

## 2025-03-12 ENCOUNTER — PATIENT OUTREACH (OUTPATIENT)
Dept: CARE COORDINATION | Facility: CLINIC | Age: 60
End: 2025-03-12
Payer: COMMERCIAL

## 2025-03-20 DIAGNOSIS — M35.3 PMR (POLYMYALGIA RHEUMATICA): ICD-10-CM

## 2025-03-20 DIAGNOSIS — Z79.52 CURRENT CHRONIC USE OF SYSTEMIC STEROIDS: ICD-10-CM

## 2025-03-20 DIAGNOSIS — M25.50 ARTHRALGIA: Primary | ICD-10-CM

## 2025-03-28 ENCOUNTER — ANCILLARY PROCEDURE (OUTPATIENT)
Dept: BONE DENSITY | Facility: CLINIC | Age: 60
End: 2025-03-28
Attending: INTERNAL MEDICINE
Payer: COMMERCIAL

## 2025-03-28 DIAGNOSIS — Z79.52 CURRENT CHRONIC USE OF SYSTEMIC STEROIDS: ICD-10-CM

## 2025-03-28 PROCEDURE — 77080 DXA BONE DENSITY AXIAL: CPT

## 2025-04-17 ENCOUNTER — TRANSFERRED RECORDS (OUTPATIENT)
Dept: HEALTH INFORMATION MANAGEMENT | Facility: CLINIC | Age: 60
End: 2025-04-17

## 2025-04-25 ENCOUNTER — OFFICE VISIT (OUTPATIENT)
Dept: FAMILY MEDICINE | Facility: OTHER | Age: 60
End: 2025-04-25
Payer: COMMERCIAL

## 2025-04-25 VITALS
RESPIRATION RATE: 20 BRPM | HEIGHT: 72 IN | WEIGHT: 197.6 LBS | OXYGEN SATURATION: 98 % | SYSTOLIC BLOOD PRESSURE: 148 MMHG | HEART RATE: 63 BPM | BODY MASS INDEX: 26.76 KG/M2 | DIASTOLIC BLOOD PRESSURE: 87 MMHG | TEMPERATURE: 97.5 F

## 2025-04-25 DIAGNOSIS — B83.9 WORMS IN STOOL: Primary | ICD-10-CM

## 2025-04-25 PROCEDURE — 3077F SYST BP >= 140 MM HG: CPT | Performed by: STUDENT IN AN ORGANIZED HEALTH CARE EDUCATION/TRAINING PROGRAM

## 2025-04-25 PROCEDURE — 99213 OFFICE O/P EST LOW 20 MIN: CPT | Performed by: STUDENT IN AN ORGANIZED HEALTH CARE EDUCATION/TRAINING PROGRAM

## 2025-04-25 PROCEDURE — 3079F DIAST BP 80-89 MM HG: CPT | Performed by: STUDENT IN AN ORGANIZED HEALTH CARE EDUCATION/TRAINING PROGRAM

## 2025-04-25 PROCEDURE — 87168 MACROSCOPIC EXAM ARTHROPOD: CPT | Mod: ZL | Performed by: STUDENT IN AN ORGANIZED HEALTH CARE EDUCATION/TRAINING PROGRAM

## 2025-04-25 PROCEDURE — 1125F AMNT PAIN NOTED PAIN PRSNT: CPT | Performed by: STUDENT IN AN ORGANIZED HEALTH CARE EDUCATION/TRAINING PROGRAM

## 2025-04-25 RX ORDER — PREDNISONE 1 MG/1
TABLET ORAL
COMMUNITY
Start: 2025-03-17

## 2025-04-25 RX ORDER — ALENDRONATE SODIUM 70 MG/1
TABLET ORAL
COMMUNITY
Start: 2025-04-17

## 2025-04-25 RX ORDER — METHOTREXATE 2.5 MG/1
TABLET ORAL
COMMUNITY
Start: 2025-04-17

## 2025-04-25 RX ORDER — FOLIC ACID 1 MG/1
1 TABLET ORAL
COMMUNITY
Start: 2025-04-17

## 2025-04-25 RX ORDER — PREDNISONE 5 MG/1
3 TABLET ORAL
COMMUNITY
Start: 2025-03-07

## 2025-04-25 ASSESSMENT — PAIN SCALES - GENERAL: PAINLEVEL_OUTOF10: MILD PAIN (2)

## 2025-04-25 NOTE — NURSING NOTE
Chief Complaint   Patient presents with    Buttock problems     Live worm in bowel movement.   Patient presents to the clinic today for BM problems. Patient states he had a BM this morning and when he went to wipe he found a live worm was on his toilet paper. Patient states he feels just fine.     Initial BP (!) 148/87 (BP Location: Left arm, Patient Position: Sitting, Cuff Size: Adult Regular)   Pulse 63   Temp 97.5  F (36.4  C) (Temporal)   Resp 20   Ht 1.829 m (6')   Wt 89.6 kg (197 lb 9.6 oz)   SpO2 98%   BMI 26.80 kg/m   Estimated body mass index is 26.8 kg/m  as calculated from the following:    Height as of this encounter: 1.829 m (6').    Weight as of this encounter: 89.6 kg (197 lb 9.6 oz).  Medication Review: complete    The next two questions are to help us understand your food security.  If you are feeling you need any assistance in this area, we have resources available to support you today.          4/25/2025   SDOH- Food Insecurity   Within the past 12 months, did you worry that your food would run out before you got money to buy more? N   Within the past 12 months, did the food you bought just not last and you didn t have money to get more? N         Health Care Directive:  Patient does not have a Health Care Directive: Discussed advance care planning with patient; however, patient declined at this time.    Jonathon Rojas

## 2025-04-25 NOTE — PROGRESS NOTES
Assessment & Plan     (B83.9) Worms in stool  (primary encounter diagnosis)    Comment: worm noted on toilet paper. It was about one inch long, legs, similar in appearance to caterpillar/mealworm. Does not look like pinworm, roundworm, or common human parasite.    Plan: Macroscopic parasite identification          Plan to parasitic identification. No treatment at this time. Discussed with patient unlikely internal parasite, but rather from toilet paper, outhouse, etc. Follow up as needed.        Carl Lopez is a 60 year old, presenting for the following health issues:  Buttock problems (Live worm in bowel movement.)    HPI     Patient presents today with wife for concerns of worm in his stool. [place] just passed a bowel movement in the outhouse when he was doing his second wipe, he noticed a worm on the toilet paper. He notes it was a new roll of toilet paper, did not think it was unclean. He has not had any other new changes in stools. No abdominal pain. No nausea or vomiting. No recent travel.      Review of Systems  Constitutional, HEENT, cardiovascular, pulmonary, gi and gu systems are negative, except as otherwise noted.        Objective    BP (!) 148/87 (BP Location: Left arm, Patient Position: Sitting, Cuff Size: Adult Regular)   Pulse 63   Temp 97.5  F (36.4  C) (Temporal)   Resp 20   Ht 1.829 m (6')   Wt 89.6 kg (197 lb 9.6 oz)   SpO2 98%   BMI 26.80 kg/m    Body mass index is 26.8 kg/m .    Physical Exam   GENERAL: alert and no distress  RESP: no increased work of breathing  ABDOMEN: non-distended   MS: no gross musculoskeletal defects noted, no edema        Signed Electronically by: Blanche Olivier PA-C

## 2025-04-26 LAB — PARASITE IDENTIFICATION: NORMAL

## 2025-05-13 ENCOUNTER — LAB (OUTPATIENT)
Dept: LAB | Facility: CLINIC | Age: 60
End: 2025-05-13
Payer: COMMERCIAL

## 2025-05-13 DIAGNOSIS — M35.3 PMR (POLYMYALGIA RHEUMATICA): ICD-10-CM

## 2025-05-13 DIAGNOSIS — M25.50 ARTHRALGIA: ICD-10-CM

## 2025-05-13 DIAGNOSIS — Z79.52 CURRENT CHRONIC USE OF SYSTEMIC STEROIDS: ICD-10-CM

## 2025-05-13 LAB
BASOPHILS # BLD AUTO: 0 10E3/UL (ref 0–0.2)
BASOPHILS NFR BLD AUTO: 1 %
EOSINOPHIL # BLD AUTO: 0 10E3/UL (ref 0–0.7)
EOSINOPHIL NFR BLD AUTO: 0 %
ERYTHROCYTE [DISTWIDTH] IN BLOOD BY AUTOMATED COUNT: 12.2 % (ref 10–15)
ERYTHROCYTE [SEDIMENTATION RATE] IN BLOOD BY WESTERGREN METHOD: 11 MM/HR (ref 0–20)
HCT VFR BLD AUTO: 41.4 % (ref 40–53)
HGB BLD-MCNC: 13.4 G/DL (ref 13.3–17.7)
IMM GRANULOCYTES # BLD: 0 10E3/UL
IMM GRANULOCYTES NFR BLD: 0 %
LYMPHOCYTES # BLD AUTO: 1.1 10E3/UL (ref 0.8–5.3)
LYMPHOCYTES NFR BLD AUTO: 13 %
MCH RBC QN AUTO: 31.6 PG (ref 26.5–33)
MCHC RBC AUTO-ENTMCNC: 32.4 G/DL (ref 31.5–36.5)
MCV RBC AUTO: 98 FL (ref 78–100)
MONOCYTES # BLD AUTO: 0.7 10E3/UL (ref 0–1.3)
MONOCYTES NFR BLD AUTO: 8 %
NEUTROPHILS # BLD AUTO: 6.6 10E3/UL (ref 1.6–8.3)
NEUTROPHILS NFR BLD AUTO: 77 %
PLATELET # BLD AUTO: 254 10E3/UL (ref 150–450)
RBC # BLD AUTO: 4.24 10E6/UL (ref 4.4–5.9)
WBC # BLD AUTO: 8.6 10E3/UL (ref 4–11)

## 2025-05-13 PROCEDURE — 86140 C-REACTIVE PROTEIN: CPT

## 2025-05-13 PROCEDURE — 84450 TRANSFERASE (AST) (SGOT): CPT

## 2025-05-13 PROCEDURE — 85025 COMPLETE CBC W/AUTO DIFF WBC: CPT

## 2025-05-13 PROCEDURE — 85652 RBC SED RATE AUTOMATED: CPT

## 2025-05-13 PROCEDURE — 36415 COLL VENOUS BLD VENIPUNCTURE: CPT

## 2025-05-13 PROCEDURE — 84460 ALANINE AMINO (ALT) (SGPT): CPT

## 2025-05-15 LAB
ALT SERPL W P-5'-P-CCNC: 25 U/L (ref 0–70)
AST SERPL W P-5'-P-CCNC: 25 U/L (ref 0–45)
CRP SERPL-MCNC: 5.09 MG/L

## 2025-05-28 DIAGNOSIS — M35.3 POLYMYALGIA RHEUMATICA: Primary | ICD-10-CM

## 2025-06-11 ENCOUNTER — LAB (OUTPATIENT)
Dept: LAB | Facility: CLINIC | Age: 60
End: 2025-06-11
Payer: COMMERCIAL

## 2025-06-11 DIAGNOSIS — Z79.52 CURRENT CHRONIC USE OF SYSTEMIC STEROIDS: ICD-10-CM

## 2025-06-11 DIAGNOSIS — M35.3 PMR (POLYMYALGIA RHEUMATICA): ICD-10-CM

## 2025-06-11 DIAGNOSIS — M25.50 ARTHRALGIA: ICD-10-CM

## 2025-06-11 LAB
ALT SERPL W P-5'-P-CCNC: 26 U/L (ref 0–70)
AST SERPL W P-5'-P-CCNC: 27 U/L (ref 0–45)
BASOPHILS # BLD AUTO: 0.1 10E3/UL (ref 0–0.2)
BASOPHILS NFR BLD AUTO: 1 %
CREAT SERPL-MCNC: 0.92 MG/DL (ref 0.67–1.17)
CRP SERPL-MCNC: <3 MG/L
EGFRCR SERPLBLD CKD-EPI 2021: >90 ML/MIN/1.73M2
EOSINOPHIL # BLD AUTO: 0.1 10E3/UL (ref 0–0.7)
EOSINOPHIL NFR BLD AUTO: 2 %
ERYTHROCYTE [DISTWIDTH] IN BLOOD BY AUTOMATED COUNT: 12.4 % (ref 10–15)
ERYTHROCYTE [SEDIMENTATION RATE] IN BLOOD BY WESTERGREN METHOD: 6 MM/HR (ref 0–20)
HCT VFR BLD AUTO: 43 % (ref 40–53)
HGB BLD-MCNC: 13.8 G/DL (ref 13.3–17.7)
IMM GRANULOCYTES # BLD: 0 10E3/UL
IMM GRANULOCYTES NFR BLD: 1 %
LYMPHOCYTES # BLD AUTO: 1.7 10E3/UL (ref 0.8–5.3)
LYMPHOCYTES NFR BLD AUTO: 25 %
MCH RBC QN AUTO: 31.9 PG (ref 26.5–33)
MCHC RBC AUTO-ENTMCNC: 32.1 G/DL (ref 31.5–36.5)
MCV RBC AUTO: 99 FL (ref 78–100)
MONOCYTES # BLD AUTO: 0.9 10E3/UL (ref 0–1.3)
MONOCYTES NFR BLD AUTO: 13 %
NEUTROPHILS # BLD AUTO: 4.1 10E3/UL (ref 1.6–8.3)
NEUTROPHILS NFR BLD AUTO: 59 %
PLATELET # BLD AUTO: 209 10E3/UL (ref 150–450)
RBC # BLD AUTO: 4.33 10E6/UL (ref 4.4–5.9)
WBC # BLD AUTO: 6.9 10E3/UL (ref 4–11)

## 2025-06-11 PROCEDURE — 86140 C-REACTIVE PROTEIN: CPT

## 2025-06-11 PROCEDURE — 84450 TRANSFERASE (AST) (SGOT): CPT

## 2025-06-11 PROCEDURE — 85025 COMPLETE CBC W/AUTO DIFF WBC: CPT

## 2025-06-11 PROCEDURE — 82565 ASSAY OF CREATININE: CPT

## 2025-06-11 PROCEDURE — 36415 COLL VENOUS BLD VENIPUNCTURE: CPT

## 2025-06-11 PROCEDURE — 84460 ALANINE AMINO (ALT) (SGPT): CPT

## 2025-06-11 PROCEDURE — 85652 RBC SED RATE AUTOMATED: CPT

## 2025-06-17 ENCOUNTER — TRANSFERRED RECORDS (OUTPATIENT)
Dept: HEALTH INFORMATION MANAGEMENT | Facility: CLINIC | Age: 60
End: 2025-06-17
Payer: COMMERCIAL

## 2025-07-09 ENCOUNTER — MYC REFILL (OUTPATIENT)
Dept: FAMILY MEDICINE | Facility: CLINIC | Age: 60
End: 2025-07-09
Payer: COMMERCIAL

## 2025-07-09 RX ORDER — METHOTREXATE 2.5 MG/1
TABLET ORAL
Status: CANCELLED | OUTPATIENT
Start: 2025-07-09

## 2025-07-09 NOTE — TELEPHONE ENCOUNTER
Writer responded via [employer].  MATTY HuaN, RN-BC  [facility]

## 2025-07-10 NOTE — TELEPHONE ENCOUNTER
happyview[employer] message was reviewed by patient and spouse.  Last read by John Rivera at 3:02PM on 7/9/2025.   Last read by Krystina Coy at 1:03PM on 7/9/2025.    ELI Crason, BSN, PHN, AMB-BC (she/her)  [facility] RN

## 2025-08-19 ENCOUNTER — LAB (OUTPATIENT)
Dept: LAB | Facility: CLINIC | Age: 60
End: 2025-08-19
Payer: COMMERCIAL

## 2025-08-19 DIAGNOSIS — Z13.6 SCREENING FOR CARDIOVASCULAR CONDITION: Primary | ICD-10-CM

## 2025-08-19 DIAGNOSIS — Z79.52 CURRENT CHRONIC USE OF SYSTEMIC STEROIDS: ICD-10-CM

## 2025-08-19 DIAGNOSIS — M25.50 ARTHRALGIA: ICD-10-CM

## 2025-08-19 DIAGNOSIS — M35.3 PMR (POLYMYALGIA RHEUMATICA): ICD-10-CM

## 2025-08-19 DIAGNOSIS — R93.1 ELEVATED CORONARY ARTERY CALCIUM SCORE: ICD-10-CM

## 2025-08-19 LAB
ALT SERPL W P-5'-P-CCNC: 22 U/L (ref 0–70)
AST SERPL W P-5'-P-CCNC: 27 U/L (ref 0–45)
BASOPHILS # BLD AUTO: 0.04 10E3/UL (ref 0–0.2)
BASOPHILS NFR BLD AUTO: 0.5 %
CHOLEST SERPL-MCNC: 170 MG/DL
CREAT SERPL-MCNC: 0.91 MG/DL (ref 0.67–1.17)
CRP SERPL-MCNC: <3 MG/L
EGFRCR SERPLBLD CKD-EPI 2021: >90 ML/MIN/1.73M2
EOSINOPHIL # BLD AUTO: <0.04 10E3/UL (ref 0–0.7)
EOSINOPHIL NFR BLD AUTO: 0.2 %
ERYTHROCYTE [DISTWIDTH] IN BLOOD BY AUTOMATED COUNT: 12.5 % (ref 10–15)
ERYTHROCYTE [SEDIMENTATION RATE] IN BLOOD BY WESTERGREN METHOD: 4 MM/HR (ref 0–20)
FASTING STATUS PATIENT QL REPORTED: NO
HCT VFR BLD AUTO: 42.2 % (ref 40–53)
HDLC SERPL-MCNC: 76 MG/DL
HGB BLD-MCNC: 13.6 G/DL (ref 13.3–17.7)
IMM GRANULOCYTES # BLD: <0.04 10E3/UL
IMM GRANULOCYTES NFR BLD: 0.1 %
LDLC SERPL CALC-MCNC: 84 MG/DL
LYMPHOCYTES # BLD AUTO: 0.94 10E3/UL (ref 0.8–5.3)
LYMPHOCYTES NFR BLD AUTO: 11.2 %
MCH RBC QN AUTO: 32.2 PG (ref 26.5–33)
MCHC RBC AUTO-ENTMCNC: 32.2 G/DL (ref 31.5–36.5)
MCV RBC AUTO: 100 FL (ref 78–100)
MONOCYTES # BLD AUTO: 0.61 10E3/UL (ref 0–1.3)
MONOCYTES NFR BLD AUTO: 7.3 %
NEUTROPHILS # BLD AUTO: 6.75 10E3/UL (ref 1.6–8.3)
NEUTROPHILS NFR BLD AUTO: 80.7 %
NONHDLC SERPL-MCNC: 94 MG/DL
PLATELET # BLD AUTO: 217 10E3/UL (ref 150–450)
RBC # BLD AUTO: 4.22 10E6/UL (ref 4.4–5.9)
TRIGL SERPL-MCNC: 49 MG/DL
WBC # BLD AUTO: 8.37 10E3/UL (ref 4–11)

## 2025-08-19 PROCEDURE — 84450 TRANSFERASE (AST) (SGOT): CPT

## 2025-08-19 PROCEDURE — 85652 RBC SED RATE AUTOMATED: CPT

## 2025-08-19 PROCEDURE — 86140 C-REACTIVE PROTEIN: CPT

## 2025-08-19 PROCEDURE — 36415 COLL VENOUS BLD VENIPUNCTURE: CPT

## 2025-08-19 PROCEDURE — 84460 ALANINE AMINO (ALT) (SGPT): CPT

## 2025-08-19 PROCEDURE — 85025 COMPLETE CBC W/AUTO DIFF WBC: CPT

## 2025-08-19 PROCEDURE — 80061 LIPID PANEL: CPT

## 2025-08-19 PROCEDURE — 82565 ASSAY OF CREATININE: CPT

## (undated) RX ORDER — FENTANYL CITRATE 50 UG/ML
INJECTION, SOLUTION INTRAMUSCULAR; INTRAVENOUS
Status: DISPENSED
Start: 2024-12-12